# Patient Record
Sex: FEMALE | Race: WHITE | URBAN - METROPOLITAN AREA
[De-identification: names, ages, dates, MRNs, and addresses within clinical notes are randomized per-mention and may not be internally consistent; named-entity substitution may affect disease eponyms.]

---

## 2017-05-24 ENCOUNTER — HOSPITAL ENCOUNTER (OUTPATIENT)
Age: 62
Discharge: HOME | End: 2017-05-24

## 2017-05-31 ENCOUNTER — HOSPITAL ENCOUNTER (OUTPATIENT)
Age: 62
Discharge: HOME | End: 2017-05-31

## 2020-09-17 LAB — MAMMOGRAPHY, EXTERNAL: NEGATIVE

## 2020-10-04 ENCOUNTER — HOSPITAL ENCOUNTER (OUTPATIENT)
Age: 65
Setting detail: SPECIMEN
Discharge: HOME | End: 2020-10-04
Payer: COMMERCIAL

## 2020-10-04 ENCOUNTER — OFFICE VISIT (OUTPATIENT)
Dept: PRIMARY CARE CLINIC | Age: 65
End: 2020-10-04
Payer: COMMERCIAL

## 2020-10-04 VITALS
SYSTOLIC BLOOD PRESSURE: 138 MMHG | HEART RATE: 80 BPM | BODY MASS INDEX: 29.09 KG/M2 | TEMPERATURE: 97 F | HEIGHT: 66 IN | OXYGEN SATURATION: 99 % | WEIGHT: 181 LBS | DIASTOLIC BLOOD PRESSURE: 80 MMHG

## 2020-10-04 LAB — URIC ACID: 8.8 MG/DL (ref 2.4–5.7)

## 2020-10-04 PROCEDURE — 99203 OFFICE O/P NEW LOW 30 MIN: CPT | Performed by: FAMILY MEDICINE

## 2020-10-04 PROCEDURE — 84550 ASSAY OF BLOOD/URIC ACID: CPT

## 2020-10-04 PROCEDURE — 36415 COLL VENOUS BLD VENIPUNCTURE: CPT

## 2020-10-04 RX ORDER — PREDNISONE 20 MG/1
TABLET ORAL
Qty: 10 TABLET | Refills: 0 | Status: SHIPPED | OUTPATIENT
Start: 2020-10-04 | End: 2020-11-17

## 2020-10-04 RX ORDER — METRONIDAZOLE 500 MG/1
TABLET ORAL
COMMUNITY
Start: 2020-09-25 | End: 2020-11-17

## 2020-10-04 ASSESSMENT — ENCOUNTER SYMPTOMS
COLOR CHANGE: 1
EYES NEGATIVE: 1
RESPIRATORY NEGATIVE: 1
BACK PAIN: 0

## 2020-10-04 NOTE — LETTER
[address] [facility] A department of [facility] 99  Phone: [phone]  Fax: [phone] Doreen Estrella DO      October 4, 2020    Patient:   Nevin Monson  Date of Birth   1955  Date of visit   10/4/2020        To Whom it May Concern:      Nevin Monson was seen in my clinic on 10/4/2020. Please excuse from work 10/5/20-10/6/20 due to acute medical issues. If you have any questions or concerns, please don't hesitate to call.       Sincerely,      Kavya Rosales, DO

## 2020-10-04 NOTE — PATIENT INSTRUCTIONS
Patient Education        Purine-Restricted Diet: Care Instructions  Your Care Instructions     Purines are substances that are found in some foods. Your body turns purines into uric acid. High levels of uric acid can cause gout, which is a form of arthritis that causes pain and inflammation in joints. You may be able to help control the amount of uric acid in your body by limiting high-purine foods in your diet. Follow-up care is a key part of your treatment and safety. Be sure to make and go to all appointments, and call your doctor if you are having problems. It's also a good idea to know your test results and keep a list of the medicines you take. How can you care for yourself at home? · Plan your meals and snacks around foods that are low in purines and are safe for you to eat. These foods include:  ? Green vegetables and tomatoes. ? Fruits. ? Whole-grain breads, rice, and cereals. ? Eggs, peanut butter, and nuts. ? Low-fat milk, cheese, and other milk products. ? Popcorn. ? Gelatin desserts, chocolate, cocoa, and cakes and sweets, in small amounts. · You can eat certain foods that are medium-high in purines, but eat them only once in a while. These foods include:  ? Legumes, such as dried beans and dried peas. You can have 1 cup cooked legumes each day. ? Asparagus, cauliflower, spinach, mushrooms, and green peas. ? Fish and seafood (other than very high-purine seafood). ? Oatmeal, wheat bran, and wheat germ. · Limit very high-purine foods, including:  ? Organ meats, such as liver, kidneys, sweetbreads, and brains. ? Meats, including gee, beef, pork, and lamb. ? Game meats and any other meats in large amounts. ? Anchovies, sardines, herring, mackerel, and scallops. ? Gravy. ? Beer. Where can you learn more? Go to https://chpepiceweb."[employer]. org and sign in to your [employer] account.  Enter F448 in the [facility] box to learn more about \"Purine-Restricted Diet: Care Instructions. \"     If you do not have an account, please click on the \"Sign Up Now\" link. Current as of: August 22, 2019               Content Version: 12.5  © 2006-2020 [employer]. Care instructions adapted under license by [facility] [address] If you have questions about a medical condition or this instruction, always ask your healthcare professional. [address] any warranty or liability for your use of this information. Patient Education        Gout: Care Instructions  Your Care Instructions     Gout is a form of arthritis caused by a buildup of uric acid crystals in a joint. It causes sudden attacks of pain, swelling, redness, and stiffness, usually in one joint, especially the big toe. Gout usually comes on without a cause. But it can be brought on by drinking alcohol (especially beer) or eating seafood and red meat. Taking certain medicines, such as diuretics or aspirin, also can bring on an attack of gout. Taking your medicines as prescribed and following up with your doctor regularly can help you avoid gout attacks in the future. Follow-up care is a key part of your treatment and safety. Be sure to make and go to all appointments, and call your doctor if you are having problems. It's also a good idea to know your test results and keep a list of the medicines you take. How can you care for yourself at home? · If the joint is swollen, put ice or a cold pack on the area for 10 to 20 minutes at a time. Put a thin cloth between the ice and your skin. · Prop up the sore limb on a pillow when you ice it or anytime you sit or lie down during the next 3 days. Try to keep it above the level of your heart. This will help reduce swelling. · Rest sore joints. Avoid activities that put weight or strain on the joints for a few days. Take short rest breaks from your regular activities during the day. · Take your medicines exactly as prescribed.  Call your doctor if you think you are having a problem with your medicine. · Take pain medicines exactly as directed. ? If the doctor gave you a prescription medicine for pain, take it as prescribed. ? If you are not taking a prescription pain medicine, ask your doctor if you can take an over-the-counter medicine. · Eat less seafood and red meat. · Check with your doctor before drinking alcohol. · Losing weight, if you are overweight, may help reduce attacks of gout. But do not go on a [employer]. \" Losing a lot of weight in a short amount of time can cause a gout attack. When should you call for help? Call your doctor now or seek immediate medical care if:  · You have a fever. · The joint is so painful you cannot use it. · You have sudden, unexplained swelling, redness, warmth, or severe pain in one or more joints. Watch closely for changes in your health, and be sure to contact your doctor if:  · You have joint pain. · Your symptoms get worse or are not improving after 2 or 3 days. Where can you learn more? Go to https://[employer]. org and sign in to your [employer] account. Enter O660 in the [facility] box to learn more about \"Gout: Care Instructions. \"     If you do not have an account, please click on the \"Sign Up Now\" link. Current as of: December 9, 2019               Content Version: 12.5  © 7087-6460 [employer]. Care instructions adapted under license by [facility] ([facility]). If you have questions about a medical condition or this instruction, always ask your healthcare professional. [place] [address] any warranty or liability for your use of this information.

## 2020-10-04 NOTE — PROGRESS NOTES
10/4/2020     Luca Whittaker (:  1955) is a 71 y.o. female, here for evaluation of the following medical concerns:    Hand Pain    The incident occurred 3 to 5 days ago (started with pain on Thursday without any injury. Seemed better on Friday, but now is significantly flared again). There was no injury mechanism. The pain is present in the left wrist. The quality of the pain is described as aching. The pain is severe. The pain has been constant since the incident. Pertinent negatives include no numbness or tingling. The symptoms are aggravated by movement, lifting and palpation. She has tried acetaminophen (unable to take NSAIDs due to chronic kidney disease. Did use a topical pain patch which maybe helped slightly) for the symptoms. The treatment provided mild relief. Did review patient's med list, allergies, social history,pmhx and pshx today as noted in the record. Review of Systems   Constitutional: Negative for chills, fatigue and fever. HENT: Negative. Eyes: Negative. Respiratory: Negative. Musculoskeletal: Positive for arthralgias and joint swelling. Negative for back pain, gait problem, myalgias, neck pain and neck stiffness. Skin: Positive for color change (erythema to the left wrist joint). Negative for pallor, rash and wound. Neurological: Negative for tingling, weakness and numbness. Psychiatric/Behavioral: Negative. Prior to Visit Medications    Medication Sig Taking?  Authorizing Provider   metroNIDAZOLE (FLAGYL) 500 MG tablet take 1 tablet by mouth twice a day for 7 days AVOID ANY ALCOHOL DURING THIS WEEK Yes Historical Provider, MD   Biotin 1000 MCG TABS Take 1,000 mcg by mouth daily Yes Historical Provider, MD   predniSONE (DELTASONE) 20 MG tablet 2 tabs daily Yes Bess Tavera DO   valsartan-hydrochlorothiazide (DIOVAN HCT) 160-12.5 MG per tablet Take 1 tablet by mouth daily Yes Historical Provider, MD   cholestyramine Jadyn Sprague) 4 G packet Take 1 packet by mouth daily Yes Historical Provider, MD        Social History     Tobacco Use    Smoking status: Never Smoker    Smokeless tobacco: Never Used   Substance Use Topics    Alcohol use: No        Vitals:    10/04/20 1311   BP: 138/80   Site: Left Upper Arm   Position: Sitting   Cuff Size: Large Adult   Pulse: 80   Temp: 97 °F (36.1 °C)   TempSrc: Tympanic   SpO2: 99%   Weight: 181 lb (82.1 kg)   Height: 5' 6\" (1.676 m)     Estimated body mass index is 29.21 kg/m² as calculated from the following:    Height as of this encounter: 5' 6\" (1.676 m). Weight as of this encounter: 181 lb (82.1 kg). Physical Exam  Vitals signs and nursing note reviewed. Constitutional:       General: She is not in acute distress. Appearance: She is well-developed. She is not diaphoretic. HENT:      Head: Normocephalic and atraumatic. Eyes:      Conjunctiva/sclera: Conjunctivae normal.   Neck:      Musculoskeletal: Normal range of motion. Pulmonary:      Effort: Pulmonary effort is normal.   Musculoskeletal:         General: Swelling and tenderness present. No deformity or signs of injury. Comments: Tenderness with palpation to the left wrist.  Has erythema and swelling noted to the joint. Pain with any movement of the joint. Able to flex and extend the digits of the hand without difficulty. Neurovascularly intact   Skin:     General: Skin is warm and dry. Capillary Refill: Capillary refill takes less than 2 seconds. Coloration: Skin is not pale. Findings: Erythema present. No rash. Neurological:      Mental Status: She is alert and oriented to person, place, and time. Psychiatric:         Behavior: Behavior normal.         Thought Content: Thought content normal.         Judgment: Judgment normal.         ASSESSMENT/PLAN:  Encounter Diagnoses   Name Primary?     Acute gout due to renal impairment involving left wrist Yes    Left wrist pain      Orders Placed This Encounter Medications    predniSONE (DELTASONE) 20 MG tablet     Si tabs daily     Dispense:  10 tablet     Refill:  0     Orders Placed This Encounter   Procedures    Uric Acid     Standing Status:   Future     Number of Occurrences:   1     Standing Expiration Date:   10/4/2021     Lab Results   Component Value Date    URICACID 8.8 (H) 10/04/2020     Uric acid is elevated. Did discuss at length gout and treatment of gout. Patient hasn't been drinking as much water, so advised hydration and use of steroid as noted above. Avoid purine rich foods as able. List provided. If recurrent issues patient aware she may need to discuss allopurinol with PCP. Tylenol 1-2 tabs po q4h prn for pain    Return  if no improvement in symptoms or if any further symptoms arise. No follow-ups on file. An electronic signature was used to authenticate this note.     --Kavya Rosales, DO on 10/4/2020 at 2:04 PM

## 2020-11-17 ENCOUNTER — HOSPITAL ENCOUNTER (OUTPATIENT)
Dept: LAB | Age: 65
Discharge: HOME | End: 2020-11-17
Payer: COMMERCIAL

## 2020-11-17 ENCOUNTER — OFFICE VISIT (OUTPATIENT)
Dept: PODIATRY | Age: 65
End: 2020-11-17
Payer: COMMERCIAL

## 2020-11-17 VITALS
HEART RATE: 80 BPM | HEIGHT: 66 IN | DIASTOLIC BLOOD PRESSURE: 70 MMHG | BODY MASS INDEX: 29.09 KG/M2 | SYSTOLIC BLOOD PRESSURE: 118 MMHG | WEIGHT: 181 LBS

## 2020-11-17 LAB
ALBUMIN SERPL-MCNC: 4.1 G/DL (ref 3.5–5.2)
ALBUMIN/GLOBULIN RATIO: 1.3 (ref 1–2.5)
ALP BLD-CCNC: 83 U/L (ref 35–104)
ALT SERPL-CCNC: 10 U/L (ref 5–33)
AST SERPL-CCNC: 13 U/L
BILIRUB SERPL-MCNC: 0.56 MG/DL (ref 0.3–1.2)
BILIRUBIN DIRECT: 0.14 MG/DL
BILIRUBIN, INDIRECT: 0.42 MG/DL (ref 0–1)
GLOBULIN: 3.2 G/DL (ref 1.5–3.8)
TOTAL PROTEIN: 7.3 G/DL (ref 6.4–8.3)

## 2020-11-17 PROCEDURE — 99203 OFFICE O/P NEW LOW 30 MIN: CPT | Performed by: PODIATRIST

## 2020-11-17 PROCEDURE — 80076 HEPATIC FUNCTION PANEL: CPT

## 2020-11-17 PROCEDURE — 36415 COLL VENOUS BLD VENIPUNCTURE: CPT

## 2020-11-17 RX ORDER — VITAMIN B COMPLEX
TABLET ORAL
COMMUNITY

## 2020-11-17 RX ORDER — TERBINAFINE HYDROCHLORIDE 250 MG/1
250 TABLET ORAL DAILY
Qty: 90 TABLET | Refills: 0 | Status: SHIPPED | OUTPATIENT
Start: 2020-11-17 | End: 2021-02-15

## 2020-11-17 RX ORDER — UBIDECARENONE 75 MG
1000 CAPSULE ORAL DAILY
COMMUNITY

## 2020-11-17 NOTE — PROGRESS NOTES
atrophic skin. Neurological: Sensation intact to light touch to level of digits, bilateral.  Protective sensation intact 10/10 sites via 5.07/10g Adair-Grover Monofilament, bilateral.  negative Tinel's, bilateral.  negative Valleix sign, bilateral.  Vibratory intact bilateral.  Reflexes Decreased bilateral.  Paresthesias negative. Dysthesias negative. Sharp/dull intact bilateral.    Musculoskeletal: Muscle strength 5/5, bilateral.  Pain absent upon palpation bilateral. Normal medial longitudinal arch, bilateral.  Ankle ROM within normal limits,bilateral.  1st MPJ ROM within normal limits, bilateral.  Dorsally contracted digits absent. No other foot deformities. Integument: Warm, dry, supple, bilateral.  Open lesion absent, bilateral.  Interdigital maceration absent to web spaces bilateral.   Nails left 1 and right 1 thickened, dystrophic and crumbly, discolored with mild subungual debris. Seen distal 1/2 of nails with distal onycholysis. Fissures absent, bilateral. Hyperkeratotic tissue is absent. Asessment: Patient is a 76 y.o. female with:    Diagnosis Orders   1. Dermatophytosis of nail  Hepatic Function Panel   2. Deformity of nail bed  Hepatic Function Panel       Plan: Patient examined and evaluated. Current condition and treatment options discussed in detail. Orders Placed This Encounter   Procedures    Hepatic Function Panel     Standing Status:   Future     Standing Expiration Date:   12/17/2020     Pt given tx options for anti fungal therapy. Pt educated on Rx po lamisil, Rx topical Penlac, OTC home remedies or other OTC topical meds. If labs ok Send in Rx lamsil 250 mg #90, 1 po qd  Contact office with any questions/problems/concerns. RTC in 3month(s).

## 2020-12-11 ENCOUNTER — TELEPHONE (OUTPATIENT)
Dept: PODIATRY | Age: 65
End: 2020-12-11

## 2020-12-11 NOTE — TELEPHONE ENCOUNTER
Patient called stating that she looked up the Lamisil and seen that it should not be taken if the person has kidney disease. Patient stated that she does not want to take it if this is the case and is wondering if there is something else she can take that maybe is topical instead.      Patient's preferred pharmacy is [facility] in [place]    Last Appt:  11/17/2020  Next Appt:   Visit date not found  Med verified in Epic

## 2021-09-29 ENCOUNTER — OFFICE VISIT (OUTPATIENT)
Dept: PRIMARY CARE CLINIC | Age: 66
End: 2021-09-29
Payer: COMMERCIAL

## 2021-09-29 VITALS
SYSTOLIC BLOOD PRESSURE: 140 MMHG | OXYGEN SATURATION: 99 % | TEMPERATURE: 98.7 F | WEIGHT: 176 LBS | HEART RATE: 66 BPM | BODY MASS INDEX: 28.28 KG/M2 | DIASTOLIC BLOOD PRESSURE: 90 MMHG | HEIGHT: 66 IN

## 2021-09-29 DIAGNOSIS — M10.9 ACUTE GOUT OF RIGHT FOOT, UNSPECIFIED CAUSE: Primary | ICD-10-CM

## 2021-09-29 PROCEDURE — 99212 OFFICE O/P EST SF 10 MIN: CPT | Performed by: NURSE PRACTITIONER

## 2021-09-29 PROCEDURE — 99213 OFFICE O/P EST LOW 20 MIN: CPT | Performed by: NURSE PRACTITIONER

## 2021-09-29 RX ORDER — TRAMADOL HYDROCHLORIDE 50 MG/1
50 TABLET ORAL EVERY 6 HOURS PRN
Qty: 12 TABLET | Refills: 0 | Status: SHIPPED | OUTPATIENT
Start: 2021-09-29 | End: 2021-10-02

## 2021-09-29 RX ORDER — PREDNISONE 20 MG/1
20 TABLET ORAL 2 TIMES DAILY
Qty: 10 TABLET | Refills: 0 | Status: SHIPPED | OUTPATIENT
Start: 2021-09-29 | End: 2021-12-22 | Stop reason: SDUPTHER

## 2021-09-29 SDOH — ECONOMIC STABILITY: FOOD INSECURITY: WITHIN THE PAST 12 MONTHS, THE FOOD YOU BOUGHT JUST DIDN'T LAST AND YOU DIDN'T HAVE MONEY TO GET MORE.: NEVER TRUE

## 2021-09-29 SDOH — ECONOMIC STABILITY: FOOD INSECURITY: WITHIN THE PAST 12 MONTHS, YOU WORRIED THAT YOUR FOOD WOULD RUN OUT BEFORE YOU GOT MONEY TO BUY MORE.: NEVER TRUE

## 2021-09-29 ASSESSMENT — PATIENT HEALTH QUESTIONNAIRE - PHQ9
SUM OF ALL RESPONSES TO PHQ QUESTIONS 1-9: 0
2. FEELING DOWN, DEPRESSED OR HOPELESS: 0
SUM OF ALL RESPONSES TO PHQ QUESTIONS 1-9: 0
1. LITTLE INTEREST OR PLEASURE IN DOING THINGS: 0
SUM OF ALL RESPONSES TO PHQ QUESTIONS 1-9: 0
SUM OF ALL RESPONSES TO PHQ9 QUESTIONS 1 & 2: 0

## 2021-09-29 ASSESSMENT — SOCIAL DETERMINANTS OF HEALTH (SDOH): HOW HARD IS IT FOR YOU TO PAY FOR THE VERY BASICS LIKE FOOD, HOUSING, MEDICAL CARE, AND HEATING?: NOT HARD AT ALL

## 2021-09-29 NOTE — PROGRESS NOTES
Subjective:      Patient ID: Mary Staley is a 76 y.o. female coming in for   Chief Complaint   Patient presents with    Foot Swelling     right side started 2 days ago but worse this am when she woke up hx of gout        Swelling/pain to right foot ongoing for two days. Does have history of gout, feels like her previous flare ups. No injury/trauma. Has tried elevated and ice with minimal relief. Review of Systems     Objective:  BP (!) 140/90   Pulse 66   Temp 98.7 °F (37.1 °C)   Ht 5' 6\" (1.676 m)   Wt 176 lb (79.8 kg)   SpO2 99%   BMI 28.41 kg/m²      Physical Exam  Vitals and nursing note reviewed. Constitutional:       General: She is not in acute distress. Appearance: Normal appearance. Cardiovascular:      Pulses:           Dorsalis pedis pulses are 2+ on the right side. Posterior tibial pulses are 2+ on the right side. Pulmonary:      Effort: Pulmonary effort is normal.   Feet:      Right foot:      Skin integrity: Erythema (swelling noted to entire foot, but mainly over medial maleolous and dorsum of outer footf) present. No warmth. Neurological:      General: No focal deficit present. Mental Status: She is alert and oriented to person, place, and time. Assessment:      1. Acute gout of right foot, unspecified cause           Plan:    Meds as directed. Call PCP if symptoms worsen/persist     No orders of the defined types were placed in this encounter. Outpatient Encounter Medications as of 9/29/2021   Medication Sig Dispense Refill    predniSONE (DELTASONE) 20 MG tablet Take 1 tablet by mouth 2 times daily for 5 days 10 tablet 0    traMADol (ULTRAM) 50 MG tablet Take 1 tablet by mouth every 6 hours as needed for Pain for up to 3 days. Intended supply: 3 days.  Take lowest dose possible to manage pain 12 tablet 0    Vitamin D (CHOLECALCIFEROL) 25 MCG (1000 UT) TABS tablet Take by mouth       vitamin B-12 (CYANOCOBALAMIN) 100 MCG tablet Take 1,000 mcg by mouth daily      Biotin 5000 MCG CAPS Take 1,000 mcg by mouth daily       valsartan-hydroCHLOROthiazide (DIOVAN HCT) 320-12.5 MG per tablet Take 1 tablet by mouth daily       cholestyramine (QUESTRAN) 4 G packet Take 1 packet by mouth daily      [DISCONTINUED] ciclopirox (PENLAC) 8 % solution Apply topically nightly. (Patient not taking: Reported on 9/29/2021) 1 Bottle 3     No facility-administered encounter medications on file as of 9/29/2021.             Nicolas Wiley, JUVENTINO - CNP

## 2021-10-21 ENCOUNTER — OFFICE VISIT (OUTPATIENT)
Dept: FAMILY MEDICINE CLINIC | Age: 66
End: 2021-10-21
Payer: COMMERCIAL

## 2021-10-21 VITALS
SYSTOLIC BLOOD PRESSURE: 124 MMHG | HEART RATE: 57 BPM | TEMPERATURE: 97.4 F | OXYGEN SATURATION: 99 % | RESPIRATION RATE: 18 BRPM | WEIGHT: 181 LBS | HEIGHT: 66 IN | DIASTOLIC BLOOD PRESSURE: 74 MMHG | BODY MASS INDEX: 29.09 KG/M2

## 2021-10-21 DIAGNOSIS — K52.9 CHRONIC DIARRHEA: ICD-10-CM

## 2021-10-21 DIAGNOSIS — I12.9 BENIGN HYPERTENSION WITH CHRONIC KIDNEY DISEASE, STAGE III (HCC): Primary | ICD-10-CM

## 2021-10-21 DIAGNOSIS — N18.30 BENIGN HYPERTENSION WITH CHRONIC KIDNEY DISEASE, STAGE III (HCC): Primary | ICD-10-CM

## 2021-10-21 PROBLEM — M10.9 GOUTY ARTHRITIS: Status: ACTIVE | Noted: 2021-05-14

## 2021-10-21 PROCEDURE — 99203 OFFICE O/P NEW LOW 30 MIN: CPT | Performed by: NURSE PRACTITIONER

## 2021-10-21 RX ORDER — VALSARTAN AND HYDROCHLOROTHIAZIDE 160; 12.5 MG/1; MG/1
1 TABLET, FILM COATED ORAL DAILY
Qty: 90 TABLET | Refills: 1 | Status: SHIPPED | OUTPATIENT
Start: 2021-10-21 | End: 2022-03-25 | Stop reason: SDUPTHER

## 2021-10-21 RX ORDER — CHOLESTYRAMINE 4 G/9G
1 POWDER, FOR SUSPENSION ORAL DAILY
Qty: 90 PACKET | Refills: 3 | Status: SHIPPED | OUTPATIENT
Start: 2021-10-21 | End: 2022-03-25 | Stop reason: SDUPTHER

## 2021-10-21 RX ORDER — VALSARTAN AND HYDROCHLOROTHIAZIDE 160; 12.5 MG/1; MG/1
1 TABLET, FILM COATED ORAL DAILY
Qty: 14 TABLET | Refills: 0 | Status: SHIPPED | OUTPATIENT
Start: 2021-10-21 | End: 2021-10-21 | Stop reason: SDUPTHER

## 2021-10-21 ASSESSMENT — PATIENT HEALTH QUESTIONNAIRE - PHQ9
SUM OF ALL RESPONSES TO PHQ QUESTIONS 1-9: 0
2. FEELING DOWN, DEPRESSED OR HOPELESS: 0
1. LITTLE INTEREST OR PLEASURE IN DOING THINGS: 0
SUM OF ALL RESPONSES TO PHQ9 QUESTIONS 1 & 2: 0

## 2021-10-21 ASSESSMENT — ENCOUNTER SYMPTOMS: DIARRHEA: 1

## 2021-10-21 NOTE — PROGRESS NOTES
Subjective:      Patient ID: Cindy Wilcox is a 76 y.o. female coming in for   Chief Complaint   Patient presents with    Medication Adjustment     would like medications adjusted    New Patient     new to establish        HPI  Presents to office for new patient appt. Previous PCP was in [place], and she retired. Past medical history included chronic diarrhea with unknown etiology, HTN and CKD III. Pt has been taking valsartan 360mg once dialy for approx 2-3 weeks and states every since she switched from valsartan/HCTZ 160/12.5mg to the current valsartan dose she has had worsening diarrhea. Pt has been checking her BP at home and states it has been 117'N systolic. She would like to switch back. Pt switched to just valsartan to try and prevent gout flares with eliminating HCTZ. Pt had routine lab work done May 2021: showed a creatinine of 1.5, and GFR 36, which compared to her labs per careeverywhere, these are stable numbers for her. Pt also did a FIT test this year which was negative. Review of Systems   Gastrointestinal: Positive for diarrhea. All other systems reviewed and are negative. Objective:  /74   Pulse 57   Temp 97.4 °F (36.3 °C)   Resp 18   Ht 5' 6\" (1.676 m)   Wt 181 lb (82.1 kg)   SpO2 99%   BMI 29.21 kg/m²      Physical Exam  Vitals and nursing note reviewed. Constitutional:       General: She is not in acute distress. Appearance: Normal appearance. She is not ill-appearing. HENT:      Head: Normocephalic. Cardiovascular:      Rate and Rhythm: Normal rate and regular rhythm. Heart sounds: Normal heart sounds. Pulmonary:      Effort: Pulmonary effort is normal.      Breath sounds: Normal breath sounds. Musculoskeletal:      Right lower leg: No edema. Left lower leg: No edema. Skin:     General: Skin is warm and dry. Findings: No rash. Neurological:      General: No focal deficit present.       Mental Status: She is alert and oriented to person, place, and time. Assessment:      1. Benign hypertension with chronic kidney disease, stage III ([facility] [address]    2. Chronic diarrhea           Plan:    switched BP meds back to valsartan/HCTZ 160/12.5mg daily. rotuine labs ordered to be done in Nov 2021, pt to follow up in 6 months or sooner if needed. Orders Placed This Encounter   Procedures    CBC     Standing Status:   Future     Standing Expiration Date:   10/21/2022    Comprehensive Metabolic Panel     Standing Status:   Future     Standing Expiration Date:   10/21/2022   Dylan Chaudhary     Standing Status:   Future     Standing Expiration Date:   10/21/2022    Lipid Panel     Standing Status:   Future     Standing Expiration Date:   10/21/2022     Order Specific Question:   Is Patient Fasting?/# of Hours     Answer:   8hrs      Outpatient Encounter Medications as of 10/21/2021   Medication Sig Dispense Refill    cholestyramine (QUESTRAN) 4 g packet Take 1 packet by mouth daily 90 packet 3    valsartan-hydroCHLOROthiazide (DIOVAN-HCT) 160-12.5 MG per tablet Take 1 tablet by mouth daily 90 tablet 1    Vitamin D (CHOLECALCIFEROL) 25 MCG (1000 UT) TABS tablet Take by mouth       vitamin B-12 (CYANOCOBALAMIN) 100 MCG tablet Take 1,000 mcg by mouth daily      Biotin 5000 MCG CAPS Take 1,000 mcg by mouth daily       [DISCONTINUED] valsartan-hydroCHLOROthiazide (DIOVAN-HCT) 160-12.5 MG per tablet Take 1 tablet by mouth daily 14 tablet 0    [DISCONTINUED] valsartan-hydroCHLOROthiazide (DIOVAN HCT) 320-12.5 MG per tablet Take 1 tablet by mouth daily       [DISCONTINUED] cholestyramine (QUESTRAN) 4 G packet Take 1 packet by mouth daily       No facility-administered encounter medications on file as of 10/21/2021.             JUVENTINO Folyd - CNP

## 2021-12-21 DIAGNOSIS — M10.9 ACUTE GOUT OF RIGHT FOOT, UNSPECIFIED CAUSE: ICD-10-CM

## 2021-12-21 NOTE — TELEPHONE ENCOUNTER
Pt requesting for gout in her toe, states she's been working 12 hours days and is having a flare, please advise pt at above number tomorrow.

## 2021-12-22 RX ORDER — PREDNISONE 20 MG/1
20 TABLET ORAL 2 TIMES DAILY
Qty: 10 TABLET | Refills: 0 | Status: SHIPPED | OUTPATIENT
Start: 2021-12-22 | End: 2021-12-27

## 2022-02-08 ENCOUNTER — TELEPHONE (OUTPATIENT)
Dept: FAMILY MEDICINE CLINIC | Age: 67
End: 2022-02-08

## 2022-03-20 LAB
ALBUMIN SERPL-MCNC: 4.1 G/DL
ALP BLD-CCNC: 95 U/L
ALT SERPL-CCNC: 12 U/L
ANION GAP SERPL CALCULATED.3IONS-SCNC: 1.7 MMOL/L
AST SERPL-CCNC: 15 U/L
BASOPHILS ABSOLUTE: 0.1 /ΜL
BASOPHILS RELATIVE PERCENT: 1 %
BILIRUB SERPL-MCNC: 0.4 MG/DL (ref 0.1–1.4)
BUN BLDV-MCNC: 23 MG/DL
CALCIUM SERPL-MCNC: 9.9 MG/DL
CHLORIDE BLD-SCNC: 106 MMOL/L
CHOLESTEROL, TOTAL: 172 MG/DL
CHOLESTEROL/HDL RATIO: ABNORMAL
CO2: 26 MMOL/L
CREAT SERPL-MCNC: 1.42 MG/DL
EOSINOPHILS ABSOLUTE: 0.3 /ΜL
EOSINOPHILS RELATIVE PERCENT: 3 %
GFR CALCULATED: 41
GLUCOSE BLD-MCNC: 94 MG/DL
HCT VFR BLD CALC: 37 % (ref 36–46)
HDLC SERPL-MCNC: 75 MG/DL (ref 35–70)
HEMOGLOBIN: 12.8 G/DL (ref 12–16)
LDL CHOLESTEROL CALCULATED: 80 MG/DL (ref 0–160)
LYMPHOCYTES ABSOLUTE: 2.3 /ΜL
LYMPHOCYTES RELATIVE PERCENT: 23 %
MCH RBC QN AUTO: 31.4 PG
MCHC RBC AUTO-ENTMCNC: 34.6 G/DL
MCV RBC AUTO: 91 FL
MONOCYTES ABSOLUTE: 0.9 /ΜL
MONOCYTES RELATIVE PERCENT: 9 %
NEUTROPHILS ABSOLUTE: 6.3 /ΜL
NEUTROPHILS RELATIVE PERCENT: 64 %
NONHDLC SERPL-MCNC: ABNORMAL MG/DL
PDW BLD-RTO: NORMAL %
PLATELET # BLD: 326 K/ΜL
PMV BLD AUTO: NORMAL FL
POTASSIUM SERPL-SCNC: 4.1 MMOL/L
RBC # BLD: 4.07 10^6/ΜL
SODIUM BLD-SCNC: 144 MMOL/L
TOTAL PROTEIN: 6.5
TRIGL SERPL-MCNC: 97 MG/DL
VLDLC SERPL CALC-MCNC: 17 MG/DL
WBC # BLD: 9.8 10^3/ML

## 2022-03-25 ENCOUNTER — OFFICE VISIT (OUTPATIENT)
Dept: FAMILY MEDICINE CLINIC | Age: 67
End: 2022-03-25
Payer: COMMERCIAL

## 2022-03-25 VITALS
OXYGEN SATURATION: 99 % | HEART RATE: 60 BPM | HEIGHT: 66 IN | DIASTOLIC BLOOD PRESSURE: 74 MMHG | WEIGHT: 179 LBS | BODY MASS INDEX: 28.77 KG/M2 | SYSTOLIC BLOOD PRESSURE: 124 MMHG | RESPIRATION RATE: 16 BRPM

## 2022-03-25 DIAGNOSIS — K52.9 CHRONIC DIARRHEA: ICD-10-CM

## 2022-03-25 DIAGNOSIS — Z12.11 COLON CANCER SCREENING: ICD-10-CM

## 2022-03-25 DIAGNOSIS — N18.30 BENIGN HYPERTENSION WITH CHRONIC KIDNEY DISEASE, STAGE III (HCC): Primary | ICD-10-CM

## 2022-03-25 DIAGNOSIS — I12.9 BENIGN HYPERTENSION WITH CHRONIC KIDNEY DISEASE, STAGE III (HCC): Primary | ICD-10-CM

## 2022-03-25 DIAGNOSIS — M10.9 GOUTY ARTHRITIS: ICD-10-CM

## 2022-03-25 PROCEDURE — 99214 OFFICE O/P EST MOD 30 MIN: CPT | Performed by: NURSE PRACTITIONER

## 2022-03-25 PROCEDURE — 99213 OFFICE O/P EST LOW 20 MIN: CPT | Performed by: NURSE PRACTITIONER

## 2022-03-25 RX ORDER — CHOLESTYRAMINE 4 G/9G
1 POWDER, FOR SUSPENSION ORAL DAILY
Qty: 90 PACKET | Refills: 3 | Status: SHIPPED | OUTPATIENT
Start: 2022-03-25 | End: 2022-10-07 | Stop reason: SDUPTHER

## 2022-03-25 RX ORDER — PREDNISONE 20 MG/1
20 TABLET ORAL 2 TIMES DAILY
Qty: 10 TABLET | Refills: 0 | Status: SHIPPED | OUTPATIENT
Start: 2022-03-25 | End: 2022-03-30

## 2022-03-25 RX ORDER — VALSARTAN AND HYDROCHLOROTHIAZIDE 160; 12.5 MG/1; MG/1
1 TABLET, FILM COATED ORAL DAILY
Qty: 90 TABLET | Refills: 1 | Status: SHIPPED | OUTPATIENT
Start: 2022-03-25 | End: 2022-10-07 | Stop reason: SDUPTHER

## 2022-03-25 ASSESSMENT — PATIENT HEALTH QUESTIONNAIRE - PHQ9
SUM OF ALL RESPONSES TO PHQ QUESTIONS 1-9: 0
2. FEELING DOWN, DEPRESSED OR HOPELESS: 0
SUM OF ALL RESPONSES TO PHQ QUESTIONS 1-9: 0
SUM OF ALL RESPONSES TO PHQ QUESTIONS 1-9: 0
1. LITTLE INTEREST OR PLEASURE IN DOING THINGS: 0
SUM OF ALL RESPONSES TO PHQ9 QUESTIONS 1 & 2: 0
SUM OF ALL RESPONSES TO PHQ QUESTIONS 1-9: 0

## 2022-03-25 ASSESSMENT — ENCOUNTER SYMPTOMS
ORTHOPNEA: 0
BLURRED VISION: 0
SHORTNESS OF BREATH: 0

## 2022-03-25 NOTE — PROGRESS NOTES
Subjective:      Patient ID: Steven Montes De Oca is a 78 y.o. female coming in for   Chief Complaint   Patient presents with    6 Month Follow-Up     6 month follow up    Hypertension     medication working well        Hypertension  This is a chronic problem. The current episode started more than 1 year ago. The problem is unchanged. The problem is controlled. Pertinent negatives include no blurred vision, chest pain, headaches, orthopnea, palpitations, peripheral edema or shortness of breath. There are no associated agents to hypertension. There are no known risk factors for coronary artery disease. Treatments tried: valsartan/hctz 160/12.5mg   The current treatment provides significant improvement. Review of Systems   Eyes: Negative for blurred vision. Respiratory: Negative for shortness of breath. Cardiovascular: Negative for chest pain, palpitations and orthopnea. Musculoskeletal: Positive for arthralgias. Complaints of arthritic pain, has had one flare up of gout since last visit. Neurological: Negative for headaches. All other systems reviewed and are negative. Objective:  /74   Pulse 60   Resp 16   Ht 5' 6\" (1.676 m)   Wt 179 lb (81.2 kg)   SpO2 99%   BMI 28.89 kg/m²      Physical Exam  Vitals and nursing note reviewed. Constitutional:       General: She is not in acute distress. Appearance: Normal appearance. She is not ill-appearing. HENT:      Head: Normocephalic. Cardiovascular:      Rate and Rhythm: Normal rate and regular rhythm. Heart sounds: Normal heart sounds. Pulmonary:      Effort: Pulmonary effort is normal.      Breath sounds: Normal breath sounds. Musculoskeletal:      Right lower leg: No edema. Left lower leg: No edema. Skin:     General: Skin is warm and dry. Findings: No rash. Neurological:      General: No focal deficit present. Mental Status: She is alert and oriented to person, place, and time. Assessment:      1. Benign hypertension with chronic kidney disease, stage III ([facility] [address]    2. Gouty arthritis    3. Chronic diarrhea    4. Colon cancer screening           Plan:   -BP stable/controlled. Continue with valsartan/hctz  -prednisone script given for prn use  -I did discuss possible allopurinol if gout flares persist or worsen  -diet info given on low purine foods  -cologuard ordered  -will obtain labs done at outside facility  -f/u in 6 months     Orders Placed This Encounter   Procedures    Fecal DNA Colorectal cancer screening (Cologuard)     To be completed in June      Outpatient Encounter Medications as of 3/25/2022   Medication Sig Dispense Refill    cholestyramine (QUESTRAN) 4 g packet Take 1 packet by mouth daily 90 packet 3    valsartan-hydroCHLOROthiazide (DIOVAN-HCT) 160-12.5 MG per tablet Take 1 tablet by mouth daily 90 tablet 1    predniSONE (DELTASONE) 20 MG tablet Take 1 tablet by mouth 2 times daily for 5 days 10 tablet 0    Vitamin D (CHOLECALCIFEROL) 25 MCG (1000 UT) TABS tablet Take by mouth       vitamin B-12 (CYANOCOBALAMIN) 100 MCG tablet Take 1,000 mcg by mouth daily      Biotin 5000 MCG CAPS Take 1,000 mcg by mouth daily       [DISCONTINUED] cholestyramine (QUESTRAN) 4 g packet Take 1 packet by mouth daily 90 packet 3    [DISCONTINUED] valsartan-hydroCHLOROthiazide (DIOVAN-HCT) 160-12.5 MG per tablet Take 1 tablet by mouth daily 90 tablet 1     No facility-administered encounter medications on file as of 3/25/2022.             Sarah Glez, APRN - CNP

## 2022-03-25 NOTE — PATIENT INSTRUCTIONS
Patient Education        Purine-Restricted Diet: Care Instructions  Your Care Instructions     Purines are substances that are found in some foods. Your body turns purines into uric acid. High levels of uric acid can cause gout, which is a form of arthritis that causes pain and inflammation in joints. You may be able to help control the amount of uric acid in your body by limiting high-purine foods in your diet. Follow-up care is a key part of your treatment and safety. Be sure to make and go to all appointments, and call your doctor if you are having problems. It's also a good idea to know your test results and keep a list of the medicines you take. How can you care for yourself at home? · Plan your meals and snacks around foods that are low in purines and are safe for you to eat. These foods include:  ? Green vegetables and tomatoes. ? Fruits. ? Whole-grain breads, rice, and cereals. ? Eggs, peanut butter, and nuts. ? Low-fat milk, cheese, and other milk products. ? Popcorn. ? Gelatin desserts, chocolate, cocoa, and cakes and sweets, in small amounts. · You can eat certain foods that are medium-high in purines, but eat them only once in a while. These foods include:  ? Legumes, such as dried beans and dried peas. You can have 1 cup cooked legumes each day. ? Asparagus, cauliflower, spinach, mushrooms, and green peas. ? Fish and seafood (other than very high-purine seafood). ? Oatmeal, wheat bran, and wheat germ. · Limit very high-purine foods, including:  ? Organ meats, such as liver, kidneys, sweetbreads, and brains. ? Meats, including gee, beef, pork, and lamb. ? Game meats and any other meats in large amounts. ? Anchovies, sardines, herring, mackerel, and scallops. ? Gravy. ? Beer. Where can you learn more? Go to [email or web address] and sign in to your Instagram account.  Enter F448 in the [facility] box to learn more about \"Purine-Restricted Diet: Care Instructions. \"     If you do not have an account, please click on the \"Sign Up Now\" link. Current as of: September 8, 2021               Content Version: 13.1  © 2892-0186 [employer]wise, Incorporated. Care instructions adapted under license by [facility] ([facility]). If you have questions about a medical condition or this instruction, always ask your healthcare professional. [address] any warranty or liability for your use of this information.

## 2022-03-28 ENCOUNTER — TELEPHONE (OUTPATIENT)
Dept: FAMILY MEDICINE CLINIC | Age: 67
End: 2022-03-28

## 2022-03-28 NOTE — TELEPHONE ENCOUNTER
----- Message from Verner Days, APRN - CNP sent at 3/28/2022 12:43 PM EDT -----  Please let Latasha Rowe know I reviewed her labs. Renal function appears stable, cholesterol within a good range.  Remaining labs all normal.

## 2022-04-21 LAB — NONINV COLON CA DNA+OCC BLD SCRN STL QL: NEGATIVE

## 2022-04-28 ENCOUNTER — TELEPHONE (OUTPATIENT)
Dept: LAB | Age: 67
End: 2022-04-28

## 2022-04-28 NOTE — TELEPHONE ENCOUNTER
----- Message from JUVENTINO Turner CNP sent at 4/27/2022  5:25 PM EDT -----  Negative, repeat in 3 years

## 2022-04-28 NOTE — TELEPHONE ENCOUNTER
Writer called pt and LM with test results. Instructed pt to call the office back if she had any questions.

## 2022-08-05 ENCOUNTER — TELEPHONE (OUTPATIENT)
Dept: FAMILY MEDICINE CLINIC | Age: 67
End: 2022-08-05

## 2022-08-05 DIAGNOSIS — M10.9 GOUTY ARTHRITIS: Primary | ICD-10-CM

## 2022-08-05 RX ORDER — PREDNISONE 20 MG/1
20 TABLET ORAL 2 TIMES DAILY
Qty: 10 TABLET | Refills: 0 | Status: SHIPPED | OUTPATIENT
Start: 2022-08-05 | End: 2022-08-10

## 2022-08-05 NOTE — TELEPHONE ENCOUNTER
Patient is requesting a prescription for some prednisone for her foot. Patient states that Viviano Hamman told her to call any time that she needs a prescription to call.  Please leave a message on machine and advise if he approves this

## 2022-09-22 ENCOUNTER — TELEPHONE (OUTPATIENT)
Dept: FAMILY MEDICINE CLINIC | Age: 67
End: 2022-09-22

## 2022-09-22 NOTE — TELEPHONE ENCOUNTER
----- Message from Leighann Lin sent at 9/22/2022  3:42 PM EDT -----  Subject: Referral Request    Reason for referral request? pt requesting lab work to be ordered before   her appointment on october 7, please call pt when its ready   Provider patient wants to be referred to(if known):     Provider Phone Number(if known):     Additional Information for Provider?   ---------------------------------------------------------------------------  --------------  [id number]    [phone]; OK to leave message on voicemail  ---------------------------------------------------------------------------  --------------

## 2022-09-28 DIAGNOSIS — I12.9 BENIGN HYPERTENSION WITH CHRONIC KIDNEY DISEASE, STAGE III (HCC): Primary | ICD-10-CM

## 2022-09-28 DIAGNOSIS — I10 PRIMARY HYPERTENSION: ICD-10-CM

## 2022-09-28 DIAGNOSIS — E55.9 VITAMIN D DEFICIENCY: ICD-10-CM

## 2022-09-28 DIAGNOSIS — N18.30 BENIGN HYPERTENSION WITH CHRONIC KIDNEY DISEASE, STAGE III (HCC): Primary | ICD-10-CM

## 2022-09-28 NOTE — TELEPHONE ENCOUNTER
Patient has an appointment on 10/7/22, she is wanting to get labs done before her appointment. No labs are pended for next appointment. Please advise what labs you would like ordered for patient.

## 2022-10-02 LAB
ALBUMIN SERPL-MCNC: 4.3 G/DL
ALP BLD-CCNC: 95 U/L
ALT SERPL-CCNC: 11 U/L
ANION GAP SERPL CALCULATED.3IONS-SCNC: 1.8 MMOL/L
AST SERPL-CCNC: 16 U/L
BASOPHILS ABSOLUTE: 0.1 /ΜL
BASOPHILS RELATIVE PERCENT: 1 %
BILIRUB SERPL-MCNC: 0.5 MG/DL (ref 0.1–1.4)
BUN BLDV-MCNC: 19 MG/DL
CALCIUM SERPL-MCNC: 9.6 MG/DL
CHLORIDE BLD-SCNC: 106 MMOL/L
CHOLESTEROL, TOTAL: 200 MG/DL
CHOLESTEROL/HDL RATIO: ABNORMAL
CO2: 23 MMOL/L
CREAT SERPL-MCNC: 1.48 MG/DL
CREATININE URINE: 108 MG/DL
EOSINOPHILS ABSOLUTE: 0.2 /ΜL
EOSINOPHILS RELATIVE PERCENT: 3 %
GFR CALCULATED: 39
GLUCOSE BLD-MCNC: 94 MG/DL
HCT VFR BLD CALC: 37.4 % (ref 36–46)
HDLC SERPL-MCNC: 78 MG/DL (ref 35–70)
HEMOGLOBIN: 12.6 G/DL (ref 12–16)
LDL CHOLESTEROL CALCULATED: 106 MG/DL (ref 0–160)
LYMPHOCYTES ABSOLUTE: 1.6 /ΜL
LYMPHOCYTES RELATIVE PERCENT: 25 %
MCH RBC QN AUTO: 31.7 PG
MCHC RBC AUTO-ENTMCNC: 33.7 G/DL
MCV RBC AUTO: 94 FL
MICROALBUMIN/CREAT 24H UR: NORMAL MG/G{CREAT}
MONOCYTES ABSOLUTE: 0.6 /ΜL
MONOCYTES RELATIVE PERCENT: 10 %
NEUTROPHILS ABSOLUTE: 4.1 /ΜL
NEUTROPHILS RELATIVE PERCENT: 61 %
NONHDLC SERPL-MCNC: ABNORMAL MG/DL
PDW BLD-RTO: NORMAL %
PLATELET # BLD: 229 K/ΜL
PMV BLD AUTO: NORMAL FL
POTASSIUM SERPL-SCNC: 4.3 MMOL/L
RBC # BLD: 3.97 10^6/ΜL
SODIUM BLD-SCNC: 141 MMOL/L
TOTAL PROTEIN: 6.7
TRIGL SERPL-MCNC: 91 MG/DL
VITAMIN D 25-HYDROXY: 29.2
VITAMIN D2, 25 HYDROXY: NORMAL
VITAMIN D3,25 HYDROXY: NORMAL
VLDLC SERPL CALC-MCNC: 16 MG/DL
WBC # BLD: 6.6 10^3/ML

## 2022-10-05 DIAGNOSIS — N18.30 BENIGN HYPERTENSION WITH CHRONIC KIDNEY DISEASE, STAGE III (HCC): ICD-10-CM

## 2022-10-05 DIAGNOSIS — I10 PRIMARY HYPERTENSION: ICD-10-CM

## 2022-10-05 DIAGNOSIS — E55.9 VITAMIN D DEFICIENCY: ICD-10-CM

## 2022-10-05 DIAGNOSIS — I12.9 BENIGN HYPERTENSION WITH CHRONIC KIDNEY DISEASE, STAGE III (HCC): ICD-10-CM

## 2022-10-07 ENCOUNTER — OFFICE VISIT (OUTPATIENT)
Dept: FAMILY MEDICINE CLINIC | Age: 67
End: 2022-10-07
Payer: COMMERCIAL

## 2022-10-07 VITALS
HEART RATE: 54 BPM | OXYGEN SATURATION: 100 % | HEIGHT: 66 IN | BODY MASS INDEX: 29.25 KG/M2 | SYSTOLIC BLOOD PRESSURE: 136 MMHG | DIASTOLIC BLOOD PRESSURE: 78 MMHG | WEIGHT: 182 LBS | RESPIRATION RATE: 16 BRPM

## 2022-10-07 DIAGNOSIS — N18.30 BENIGN HYPERTENSION WITH CHRONIC KIDNEY DISEASE, STAGE III (HCC): Primary | ICD-10-CM

## 2022-10-07 DIAGNOSIS — I12.9 BENIGN HYPERTENSION WITH CHRONIC KIDNEY DISEASE, STAGE III (HCC): Primary | ICD-10-CM

## 2022-10-07 DIAGNOSIS — E55.9 VITAMIN D DEFICIENCY: ICD-10-CM

## 2022-10-07 DIAGNOSIS — Z78.0 POST-MENOPAUSAL: ICD-10-CM

## 2022-10-07 DIAGNOSIS — K52.9 CHRONIC DIARRHEA: ICD-10-CM

## 2022-10-07 DIAGNOSIS — Z13.820 OSTEOPOROSIS SCREENING: ICD-10-CM

## 2022-10-07 DIAGNOSIS — M10.9 GOUTY ARTHRITIS: ICD-10-CM

## 2022-10-07 PROCEDURE — 1123F ACP DISCUSS/DSCN MKR DOCD: CPT | Performed by: NURSE PRACTITIONER

## 2022-10-07 PROCEDURE — 99214 OFFICE O/P EST MOD 30 MIN: CPT | Performed by: NURSE PRACTITIONER

## 2022-10-07 RX ORDER — PREDNISONE 20 MG/1
20 TABLET ORAL 2 TIMES DAILY
Qty: 10 TABLET | Refills: 1 | Status: SHIPPED | OUTPATIENT
Start: 2022-10-07 | End: 2022-10-12

## 2022-10-07 RX ORDER — VALSARTAN AND HYDROCHLOROTHIAZIDE 160; 12.5 MG/1; MG/1
1 TABLET, FILM COATED ORAL DAILY
Qty: 90 TABLET | Refills: 1 | Status: SHIPPED | OUTPATIENT
Start: 2022-10-07 | End: 2022-10-07 | Stop reason: SDUPTHER

## 2022-10-07 RX ORDER — VALSARTAN AND HYDROCHLOROTHIAZIDE 160; 12.5 MG/1; MG/1
1 TABLET, FILM COATED ORAL DAILY
Qty: 90 TABLET | Refills: 1 | Status: SHIPPED | OUTPATIENT
Start: 2022-10-07

## 2022-10-07 RX ORDER — CHOLESTYRAMINE 4 G/9G
1 POWDER, FOR SUSPENSION ORAL DAILY
Qty: 90 PACKET | Refills: 3 | Status: SHIPPED | OUTPATIENT
Start: 2022-10-07 | End: 2022-10-07 | Stop reason: SDUPTHER

## 2022-10-07 RX ORDER — CHOLESTYRAMINE 4 G/9G
1 POWDER, FOR SUSPENSION ORAL DAILY
Qty: 90 PACKET | Refills: 3 | Status: SHIPPED | OUTPATIENT
Start: 2022-10-07

## 2022-10-07 ASSESSMENT — PATIENT HEALTH QUESTIONNAIRE - PHQ9
2. FEELING DOWN, DEPRESSED OR HOPELESS: 0
SUM OF ALL RESPONSES TO PHQ QUESTIONS 1-9: 0
SUM OF ALL RESPONSES TO PHQ9 QUESTIONS 1 & 2: 0
SUM OF ALL RESPONSES TO PHQ QUESTIONS 1-9: 0
SUM OF ALL RESPONSES TO PHQ QUESTIONS 1-9: 0
1. LITTLE INTEREST OR PLEASURE IN DOING THINGS: 0
SUM OF ALL RESPONSES TO PHQ QUESTIONS 1-9: 0

## 2022-10-07 ASSESSMENT — ENCOUNTER SYMPTOMS
SHORTNESS OF BREATH: 0
DIARRHEA: 1

## 2022-10-07 NOTE — PROGRESS NOTES
Subjective:      Patient ID: Rigoberto Valdovinos is a 78 y.o. female coming in for   Chief Complaint   Patient presents with    Hypertension     6 month follow up    Discuss Labs     Discuss labs from 10/2/22      Hypertension  This is a chronic problem. The current episode started more than 1 year ago. The problem is unchanged. The problem is controlled. Pertinent negatives include no chest pain, headaches, palpitations, peripheral edema or shortness of breath. There are no associated agents to hypertension. Risk factors for coronary artery disease include obesity and stress. Treatments tried: valsartan/HCTZ 160/12.5. The current treatment provides significant improvement. There are no compliance problems. Identifiable causes of hypertension include chronic renal disease. CKD 3 stable. GFR did mildly decrease from 41 to 39 from April to recent labs. Creatinine stable. Lytes stable. Pt on ARB. Vit D mildly low. Advised to increase vit D 25mcg daily to increase to 50mcg daily. Review of Systems   Respiratory:  Negative for shortness of breath. Cardiovascular:  Negative for chest pain and palpitations. Gastrointestinal:  Positive for diarrhea (Questran keeping diarrhea stable). Neurological:  Negative for headaches. Psychiatric/Behavioral:  The patient is nervous/anxious. All other systems reviewed and are negative. Objective:/78   Pulse 54   Resp 16   Ht 5' 6\" (1.676 m)   Wt 182 lb (82.6 kg)   SpO2 100%   BMI 29.38 kg/m²      Physical Exam  Vitals and nursing note reviewed. Constitutional:       General: She is not in acute distress. Appearance: Normal appearance. She is not ill-appearing. HENT:      Head: Normocephalic. Cardiovascular:      Rate and Rhythm: Normal rate and regular rhythm. Heart sounds: Normal heart sounds. Pulmonary:      Effort: Pulmonary effort is normal.      Breath sounds: Normal breath sounds.    Musculoskeletal:      Right lower leg: No edema. Left lower leg: No edema. Skin:     General: Skin is warm and dry. Findings: No rash. Neurological:      General: No focal deficit present. Mental Status: She is alert and oriented to person, place, and time. Psychiatric:         Mood and Affect: Mood is anxious. Assessment:      1. Benign hypertension with chronic kidney disease, stage III ([facility] [address]    2. Chronic diarrhea    3. Vitamin D deficiency    4. Osteoporosis screening    5. Post-menopausal    6. Gouty arthritis           Plan:    -BP controlled.    -diarrhea stable  - increase vit d to 50mcg daily  -dexa scan ordered  -will repeat BMP in 4 months to monitor CKD  -f/u in 6 months    Orders Placed This Encounter   Procedures    DEXA AXIAL SKELETON W VERTEBRAL FX ASST     Standing Status:   Future     Standing Expiration Date:   10/7/2023     Order Specific Question:   Reason for exam:     Answer:   post menopausal/ osteoporosis screening    Basic Metabolic Panel     Standing Status:   Future     Standing Expiration Date:   10/7/2023      Outpatient Encounter Medications as of 10/7/2022   Medication Sig Dispense Refill    predniSONE (DELTASONE) 20 MG tablet Take 1 tablet by mouth 2 times daily for 5 days 10 tablet 1    valsartan-hydroCHLOROthiazide (DIOVAN-HCT) 160-12.5 MG per tablet Take 1 tablet by mouth daily 90 tablet 1    cholestyramine (QUESTRAN) 4 g packet Take 1 packet by mouth daily 90 packet 3    Vitamin D (CHOLECALCIFEROL) 25 MCG (1000 UT) TABS tablet Take by mouth       vitamin B-12 (CYANOCOBALAMIN) 100 MCG tablet Take 1,000 mcg by mouth daily      Biotin 5000 MCG CAPS Take 1,000 mcg by mouth daily       [DISCONTINUED] cholestyramine (QUESTRAN) 4 g packet Take 1 packet by mouth daily 90 packet 3    [DISCONTINUED] valsartan-hydroCHLOROthiazide (DIOVAN-HCT) 160-12.5 MG per tablet Take 1 tablet by mouth daily 90 tablet 1    [DISCONTINUED] cholestyramine (QUESTRAN) 4 g packet Take 1 packet by mouth daily 90 packet 3    [DISCONTINUED] valsartan-hydroCHLOROthiazide (DIOVAN-HCT) 160-12.5 MG per tablet Take 1 tablet by mouth daily 90 tablet 1     No facility-administered encounter medications on file as of 10/7/2022.             Remi Scarce, APRN - CNP

## 2023-02-10 ENCOUNTER — TELEPHONE (OUTPATIENT)
Dept: FAMILY MEDICINE CLINIC | Age: 68
End: 2023-02-10

## 2023-02-10 DIAGNOSIS — M10.9 GOUTY ARTHRITIS: Primary | ICD-10-CM

## 2023-02-10 LAB
BUN BLDV-MCNC: 19 MG/DL
CALCIUM SERPL-MCNC: 9.6 MG/DL
CHLORIDE BLD-SCNC: 103 MMOL/L
CO2: 25 MMOL/L
CREAT SERPL-MCNC: 1.47 MG/DL
EGFR: 39
GLUCOSE BLD-MCNC: 96 MG/DL
POTASSIUM SERPL-SCNC: 4.5 MMOL/L
SODIUM BLD-SCNC: 143 MMOL/L

## 2023-02-10 RX ORDER — PREDNISONE 20 MG/1
20 TABLET ORAL 2 TIMES DAILY
Qty: 10 TABLET | Refills: 0 | Status: SHIPPED | OUTPATIENT
Start: 2023-02-10 | End: 2023-02-15

## 2023-02-10 NOTE — TELEPHONE ENCOUNTER
Pt calling stating she has gout and questions if you could call in some Prednisone as she's having a hard time getting in and out of the car, pt uses pended pharmacy, please advise.

## 2023-03-28 ENCOUNTER — OFFICE VISIT (OUTPATIENT)
Dept: PRIMARY CARE CLINIC | Age: 68
End: 2023-03-28
Payer: COMMERCIAL

## 2023-03-28 ENCOUNTER — TELEPHONE (OUTPATIENT)
Dept: FAMILY MEDICINE CLINIC | Age: 68
End: 2023-03-28

## 2023-03-28 VITALS
HEART RATE: 74 BPM | TEMPERATURE: 98.3 F | DIASTOLIC BLOOD PRESSURE: 74 MMHG | BODY MASS INDEX: 28.76 KG/M2 | OXYGEN SATURATION: 98 % | SYSTOLIC BLOOD PRESSURE: 126 MMHG | WEIGHT: 178.2 LBS

## 2023-03-28 DIAGNOSIS — M25.532 LEFT WRIST PAIN: Primary | ICD-10-CM

## 2023-03-28 DIAGNOSIS — Z87.39 HISTORY OF GOUT: ICD-10-CM

## 2023-03-28 DIAGNOSIS — M10.9 GOUTY ARTHRITIS: Primary | ICD-10-CM

## 2023-03-28 PROCEDURE — 99213 OFFICE O/P EST LOW 20 MIN: CPT | Performed by: FAMILY MEDICINE

## 2023-03-28 PROCEDURE — 1123F ACP DISCUSS/DSCN MKR DOCD: CPT | Performed by: FAMILY MEDICINE

## 2023-03-28 RX ORDER — PREDNISONE 20 MG/1
20 TABLET ORAL 2 TIMES DAILY
Qty: 10 TABLET | Refills: 0 | Status: SHIPPED | OUTPATIENT
Start: 2023-03-28 | End: 2023-04-02

## 2023-03-28 ASSESSMENT — PATIENT HEALTH QUESTIONNAIRE - PHQ9
SUM OF ALL RESPONSES TO PHQ QUESTIONS 1-9: 0
SUM OF ALL RESPONSES TO PHQ9 QUESTIONS 1 & 2: 0
SUM OF ALL RESPONSES TO PHQ QUESTIONS 1-9: 0
2. FEELING DOWN, DEPRESSED OR HOPELESS: 0
1. LITTLE INTEREST OR PLEASURE IN DOING THINGS: 0

## 2023-03-28 NOTE — LETTER
[address] [facility] A department of [place] [address]  Phone: [phone]  Fax: [phone]        Linda Jackson MD      March 29, 2023    Patient:   Lorri Wilkinson  Date of Birth   1955  Date of visit   3/28/2023        To Whom it May Concern:      Lorri Wilkinson was seen in my clinic on 3/28/2023. Please excuse from work 3/30 and 3/31/23. If you have any questions or concerns, please don't hesitate to call.       Sincerely,      Ariane Rosen MD/[facility]

## 2023-03-28 NOTE — LETTER
March 28, 2023       Severiano Hart YOB: 1955   [place] [id number] Date of Visit:  3/28/2023       To Whom It May Concern: It is my medical opinion that Severiano Hart should remain out of work until 03/27/2023-03/29/2023. If you have any questions or concerns, please don't hesitate to call.     Sincerely,        Ga Rosen MD/NF

## 2023-03-28 NOTE — TELEPHONE ENCOUNTER
Pt calling stating she has gout again and would like steroids sent to pended pharmacy, please advise.

## 2023-03-28 NOTE — PROGRESS NOTES
[facility]             [address] [place] [address], Formerly named [facility] [address]                      Telephone [phone]             Fax [phone]     Misty Miller  :  1955  Age:  75 y.o. MRN:  [id number]  Date of visit:  3/28/2023       Assessment and Plan:    1. Left wrist pain  2. History of gout  Gout flare vs. other  Prednisone was prescribed:  - predniSONE (DELTASONE) 20 MG tablet; Take 1 tablet by mouth 2 times daily for 5 days  Dispense: 10 tablet; Refill: 0      She was advised to follow up if symptoms worsen or do not resolve. Subjective:    Misty Miller is a 75 y.o. female who presents to [facility] today (3/28/2023) for evaluation of:  Gout (Left hand )      She reports pain in the left wrist for the past few days. She has a history of gout. She has taken Aleve without any significant improvement in her symptoms. She denies injury. She states that her current symptoms are similar to the symptoms she has had previously with gout flares. She has the following problem list:  Patient Active Problem List   Diagnosis    Benign hypertension with chronic kidney disease, stage III (HCC)    Chronic diarrhea    Gouty arthritis        Current medications are:  Outpatient Medications Marked as Taking for the 3/28/23 encounter (Office Visit) with SCHEDULE, PROVIDER [facility]   Medication Sig Dispense Refill    valsartan-hydroCHLOROthiazide (DIOVAN-HCT) 160-12.5 MG per tablet Take 1 tablet by mouth daily 90 tablet 1    cholestyramine (QUESTRAN) 4 g packet Take 1 packet by mouth daily 90 packet 3    Vitamin D (CHOLECALCIFEROL) 25 MCG (1000 UT) TABS tablet Take by mouth       vitamin B-12 (CYANOCOBALAMIN) 100 MCG tablet Take 1,000 mcg by mouth daily      Biotin 5000 MCG CAPS Take 1,000 mcg by mouth daily           She has No Known Allergies. She  reports that she has never smoked.  She has 27-Mar-2022 22:53

## 2023-03-29 RX ORDER — PREDNISONE 20 MG/1
20 TABLET ORAL 2 TIMES DAILY
Qty: 10 TABLET | Refills: 0 | Status: SHIPPED | OUTPATIENT
Start: 2023-03-29 | End: 2023-04-03

## 2023-03-29 NOTE — TELEPHONE ENCOUNTER
Linda Sherman called requesting a refill of the below medication which has been pended for you:     Requested Prescriptions      No prescriptions requested or ordered in this encounter       Last Appointment Date: 10/7/2022  Next Appointment Date: 4/28/2023    No Known Allergies

## 2023-05-04 ENCOUNTER — OFFICE VISIT (OUTPATIENT)
Dept: FAMILY MEDICINE CLINIC | Age: 68
End: 2023-05-04
Payer: COMMERCIAL

## 2023-05-04 VITALS
SYSTOLIC BLOOD PRESSURE: 124 MMHG | RESPIRATION RATE: 18 BRPM | DIASTOLIC BLOOD PRESSURE: 76 MMHG | BODY MASS INDEX: 29.41 KG/M2 | WEIGHT: 183 LBS | OXYGEN SATURATION: 95 % | HEART RATE: 75 BPM | HEIGHT: 66 IN

## 2023-05-04 DIAGNOSIS — I12.9 BENIGN HYPERTENSION WITH CHRONIC KIDNEY DISEASE, STAGE III (HCC): Primary | ICD-10-CM

## 2023-05-04 DIAGNOSIS — S69.92XA INJURY OF LEFT WRIST, INITIAL ENCOUNTER: ICD-10-CM

## 2023-05-04 DIAGNOSIS — N18.30 BENIGN HYPERTENSION WITH CHRONIC KIDNEY DISEASE, STAGE III (HCC): Primary | ICD-10-CM

## 2023-05-04 PROCEDURE — 1123F ACP DISCUSS/DSCN MKR DOCD: CPT | Performed by: NURSE PRACTITIONER

## 2023-05-04 PROCEDURE — 99214 OFFICE O/P EST MOD 30 MIN: CPT | Performed by: NURSE PRACTITIONER

## 2023-05-04 RX ORDER — VALSARTAN AND HYDROCHLOROTHIAZIDE 160; 12.5 MG/1; MG/1
1 TABLET, FILM COATED ORAL DAILY
Qty: 90 TABLET | Refills: 1 | Status: SHIPPED | OUTPATIENT
Start: 2023-05-04

## 2023-05-04 ASSESSMENT — PATIENT HEALTH QUESTIONNAIRE - PHQ9
SUM OF ALL RESPONSES TO PHQ9 QUESTIONS 1 & 2: 0
SUM OF ALL RESPONSES TO PHQ QUESTIONS 1-9: 0
SUM OF ALL RESPONSES TO PHQ QUESTIONS 1-9: 0
2. FEELING DOWN, DEPRESSED OR HOPELESS: 0
SUM OF ALL RESPONSES TO PHQ QUESTIONS 1-9: 0
1. LITTLE INTEREST OR PLEASURE IN DOING THINGS: 0
SUM OF ALL RESPONSES TO PHQ QUESTIONS 1-9: 0

## 2023-05-04 NOTE — PROGRESS NOTES
rash.   Neurological:      General: No focal deficit present. Mental Status: She is alert and oriented to person, place, and time. Psychiatric:         Mood and Affect: Mood is anxious. Assessment:      1. Benign hypertension with chronic kidney disease, stage III ([facility] [address]    2. Injury of left wrist, initial encounter           Plan:    -bp stable.  -will obtain bmp from outside source  -discussed xray of wrist. Will wait 1-2 weeks to see if symptoms worsen/persist  -f/u in 6 months or sooner if needed    No orders of the defined types were placed in this encounter. Outpatient Encounter Medications as of 5/4/2023   Medication Sig Dispense Refill    valsartan-hydroCHLOROthiazide (DIOVAN-HCT) 160-12.5 MG per tablet Take 1 tablet by mouth daily 90 tablet 1    cholestyramine (QUESTRAN) 4 g packet Take 1 packet by mouth daily 90 packet 3    Vitamin D (CHOLECALCIFEROL) 25 MCG (1000 UT) TABS tablet Take by mouth       vitamin B-12 (CYANOCOBALAMIN) 100 MCG tablet Take 10 tablets by mouth daily      Biotin 5000 MCG CAPS Take 1,000 mcg by mouth daily       [DISCONTINUED] valsartan-hydroCHLOROthiazide (DIOVAN-HCT) 160-12.5 MG per tablet Take 1 tablet by mouth daily 30 tablet 0     No facility-administered encounter medications on file as of 5/4/2023.             JUVENTINO Galvan - CNP

## 2023-05-05 ASSESSMENT — ENCOUNTER SYMPTOMS: SHORTNESS OF BREATH: 0

## 2023-05-07 ENCOUNTER — PATIENT MESSAGE (OUTPATIENT)
Dept: FAMILY MEDICINE CLINIC | Age: 68
End: 2023-05-07

## 2023-05-07 DIAGNOSIS — I12.9 BENIGN HYPERTENSION WITH CHRONIC KIDNEY DISEASE, STAGE III (HCC): ICD-10-CM

## 2023-05-07 DIAGNOSIS — N18.30 BENIGN HYPERTENSION WITH CHRONIC KIDNEY DISEASE, STAGE III (HCC): ICD-10-CM

## 2023-05-08 RX ORDER — VALSARTAN AND HYDROCHLOROTHIAZIDE 160; 12.5 MG/1; MG/1
1 TABLET, FILM COATED ORAL DAILY
Qty: 90 TABLET | Refills: 1 | Status: SHIPPED | OUTPATIENT
Start: 2023-05-08

## 2023-05-08 NOTE — TELEPHONE ENCOUNTER
From: Erman Koyanagi  To:  Mark Castrejon  Sent: 5/7/2023 9:55 PM EDT  Subject: Med refill    Was my valsartan 90day [facility] I forgot to mention it before I left my appt on may 4 Thanks Israel Mcclelland

## 2023-05-23 ENCOUNTER — OFFICE VISIT (OUTPATIENT)
Dept: PRIMARY CARE CLINIC | Age: 68
End: 2023-05-23
Payer: COMMERCIAL

## 2023-05-23 ENCOUNTER — HOSPITAL ENCOUNTER (OUTPATIENT)
Age: 68
Discharge: HOME | End: 2023-05-25
Payer: COMMERCIAL

## 2023-05-23 ENCOUNTER — HOSPITAL ENCOUNTER (OUTPATIENT)
Dept: GENERAL RADIOLOGY | Age: 68
Discharge: HOME | End: 2023-05-25
Payer: COMMERCIAL

## 2023-05-23 VITALS
OXYGEN SATURATION: 97 % | SYSTOLIC BLOOD PRESSURE: 129 MMHG | BODY MASS INDEX: 29.5 KG/M2 | HEART RATE: 71 BPM | WEIGHT: 182.8 LBS | DIASTOLIC BLOOD PRESSURE: 76 MMHG | TEMPERATURE: 97.9 F

## 2023-05-23 DIAGNOSIS — M25.532 LEFT WRIST PAIN: ICD-10-CM

## 2023-05-23 DIAGNOSIS — M25.532 LEFT WRIST PAIN: Primary | ICD-10-CM

## 2023-05-23 PROCEDURE — 73110 X-RAY EXAM OF WRIST: CPT

## 2023-05-23 PROCEDURE — 99214 OFFICE O/P EST MOD 30 MIN: CPT | Performed by: FAMILY MEDICINE

## 2023-05-23 PROCEDURE — 1123F ACP DISCUSS/DSCN MKR DOCD: CPT | Performed by: FAMILY MEDICINE

## 2023-05-23 SDOH — ECONOMIC STABILITY: FOOD INSECURITY: WITHIN THE PAST 12 MONTHS, THE FOOD YOU BOUGHT JUST DIDN'T LAST AND YOU DIDN'T HAVE MONEY TO GET MORE.: NEVER TRUE

## 2023-05-23 SDOH — ECONOMIC STABILITY: FOOD INSECURITY: WITHIN THE PAST 12 MONTHS, YOU WORRIED THAT YOUR FOOD WOULD RUN OUT BEFORE YOU GOT MONEY TO BUY MORE.: NEVER TRUE

## 2023-05-23 SDOH — ECONOMIC STABILITY: HOUSING INSECURITY
IN THE LAST 12 MONTHS, WAS THERE A TIME WHEN YOU DID NOT HAVE A STEADY PLACE TO SLEEP OR SLEPT IN A SHELTER (INCLUDING NOW)?: NO

## 2023-05-23 SDOH — ECONOMIC STABILITY: INCOME INSECURITY: HOW HARD IS IT FOR YOU TO PAY FOR THE VERY BASICS LIKE FOOD, HOUSING, MEDICAL CARE, AND HEATING?: NOT HARD AT ALL

## 2023-05-23 ASSESSMENT — ENCOUNTER SYMPTOMS
RESPIRATORY NEGATIVE: 1
GASTROINTESTINAL NEGATIVE: 1
EYES NEGATIVE: 1

## 2023-05-23 NOTE — PROGRESS NOTES
Subjective:      Patient ID: Lawyer Fugn is a 75 y.o. female. HPI  acute walk in clinic visit for left wrist pain. She was manipulating a package, lost control and while trying to catch it, she hit the left distal ulna on a sharp table edge. [place] 2 \"crack\"sounds. This was a month ago. Observation and supportive care. Continued chronic ache. Swelling still off and on. Not allowing any wt. Or carrying anything. Seen 5/4/23 for same. Rec. To cont. Supportive care and recheck if not getting better. Past Medical History:   Diagnosis Date    Hypertension      Past Surgical History:   Procedure Laterality Date    CHOLECYSTECTOMY      HYSTERECTOMY (CERVIX STATUS UNKNOWN)       Current Outpatient Medications   Medication Sig Dispense Refill    valsartan-hydroCHLOROthiazide (DIOVAN-HCT) 160-12.5 MG per tablet Take 1 tablet by mouth daily 90 tablet 1    cholestyramine (QUESTRAN) 4 g packet Take 1 packet by mouth daily 90 packet 3    Vitamin D (CHOLECALCIFEROL) 25 MCG (1000 UT) TABS tablet Take by mouth       vitamin B-12 (CYANOCOBALAMIN) 100 MCG tablet Take 10 tablets by mouth daily      Biotin 5000 MCG CAPS Take 1,000 mcg by mouth daily        No current facility-administered medications for this visit. No Known Allergies      Review of Systems   Constitutional: Negative. HENT: Negative. Eyes: Negative. Respiratory: Negative. Cardiovascular: Negative. Gastrointestinal: Negative. Genitourinary: Negative. Musculoskeletal:  Positive for arthralgias (left wrist). Skin: Negative. Neurological: Negative. Psychiatric/Behavioral: Negative. Objective:   Physical Exam  Constitutional:       General: She is not in acute distress. Appearance: She is well-developed. HENT:      Head: Normocephalic and atraumatic. Right Ear: External ear normal.      Left Ear: External ear normal.      Mouth/Throat:      Pharynx: No oropharyngeal exudate.    Eyes:

## 2023-08-04 DIAGNOSIS — K52.9 CHRONIC DIARRHEA: ICD-10-CM

## 2023-08-04 RX ORDER — CHOLESTYRAMINE 4 G/9G
1 POWDER, FOR SUSPENSION ORAL DAILY
Qty: 90 PACKET | Refills: 1 | Status: SHIPPED | OUTPATIENT
Start: 2023-08-04

## 2023-08-04 NOTE — TELEPHONE ENCOUNTER
Pt requesting temp refill to [facility].      Michelle Quiles called requesting a refill of the below medication which has been pended for you:     Requested Prescriptions     Pending Prescriptions Disp Refills    cholestyramine (QUESTRAN) 4 g packet 90 packet 1     Sig: Take 1 packet by mouth daily       Last Appointment Date: 5/4/2023  Next Appointment Date: Visit date not found    No Known Allergies

## 2023-09-01 DIAGNOSIS — K52.9 CHRONIC DIARRHEA: ICD-10-CM

## 2023-09-01 RX ORDER — CHOLESTYRAMINE 4 G/9G
1 POWDER, FOR SUSPENSION ORAL DAILY
Qty: 90 PACKET | Refills: 1 | Status: SHIPPED | OUTPATIENT
Start: 2023-09-01

## 2023-09-01 NOTE — TELEPHONE ENCOUNTER
Cely Wang called requesting a refill of the below medication which has been pended for you:     Patient requesting to be sent to [facility] because she prefers their      Requested Prescriptions     Pending Prescriptions Disp Refills    cholestyramine (QUESTRAN) 4 g packet 90 packet 1     Sig: Take 1 packet by mouth daily       Last Appointment Date: 5/4/2023  Next Appointment Date: 11/9/2023    No Known Allergies

## 2023-10-03 DIAGNOSIS — K52.9 CHRONIC DIARRHEA: ICD-10-CM

## 2023-10-03 NOTE — TELEPHONE ENCOUNTER
Pt requesting a written script so she can shop around as she's having a hard time finding pharmacy's that carry in, pt aware BE out until tomorrow.

## 2023-10-03 NOTE — TELEPHONE ENCOUNTER
Augustina Pino called requesting a refill of the below medication which has been pended for you:     Requested Prescriptions     Pending Prescriptions Disp Refills    cholestyramine (QUESTRAN) 4 g packet 90 packet 1     Sig: Take 1 packet by mouth daily       Last Appointment Date: 5/4/2023  Next Appointment Date: 12/1/2023    No Known Allergies

## 2023-10-04 DIAGNOSIS — K52.9 CHRONIC DIARRHEA: ICD-10-CM

## 2023-10-04 RX ORDER — CHOLESTYRAMINE 4 G/9G
1 POWDER, FOR SUSPENSION ORAL DAILY
Qty: 90 PACKET | Refills: 3 | Status: SHIPPED | OUTPATIENT
Start: 2023-10-04 | End: 2023-10-04 | Stop reason: SDUPTHER

## 2023-10-04 RX ORDER — CHOLESTYRAMINE 4 G/9G
1 POWDER, FOR SUSPENSION ORAL DAILY
Qty: 90 PACKET | Refills: 3 | Status: SHIPPED | OUTPATIENT
Start: 2023-10-04 | End: 2023-10-05 | Stop reason: SDUPTHER

## 2023-10-05 DIAGNOSIS — K52.9 CHRONIC DIARRHEA: ICD-10-CM

## 2023-10-05 RX ORDER — CHOLESTYRAMINE 4 G/9G
1 POWDER, FOR SUSPENSION ORAL DAILY
Qty: 90 PACKET | Refills: 3 | Status: SHIPPED | OUTPATIENT
Start: 2023-10-05

## 2023-11-13 DIAGNOSIS — I12.9 BENIGN HYPERTENSION WITH CHRONIC KIDNEY DISEASE, STAGE III (HCC): ICD-10-CM

## 2023-11-13 DIAGNOSIS — N18.30 BENIGN HYPERTENSION WITH CHRONIC KIDNEY DISEASE, STAGE III (HCC): ICD-10-CM

## 2023-11-14 ENCOUNTER — PATIENT MESSAGE (OUTPATIENT)
Dept: FAMILY MEDICINE CLINIC | Age: 68
End: 2023-11-14

## 2023-11-14 DIAGNOSIS — I12.9 BENIGN HYPERTENSION WITH CHRONIC KIDNEY DISEASE, STAGE III (HCC): Primary | ICD-10-CM

## 2023-11-14 DIAGNOSIS — N18.30 BENIGN HYPERTENSION WITH CHRONIC KIDNEY DISEASE, STAGE III (HCC): Primary | ICD-10-CM

## 2023-11-14 DIAGNOSIS — E55.9 VITAMIN D DEFICIENCY: ICD-10-CM

## 2023-11-14 RX ORDER — VALSARTAN AND HYDROCHLOROTHIAZIDE 160; 12.5 MG/1; MG/1
1 TABLET, FILM COATED ORAL DAILY
Qty: 90 TABLET | Refills: 0 | Status: SHIPPED | OUTPATIENT
Start: 2023-11-14

## 2023-11-14 NOTE — TELEPHONE ENCOUNTER
BE pt, Marielena Montelongo on notes    Ingris Lockwood called requesting a refill of the below medication which has been pended for you:     Requested Prescriptions     Pending Prescriptions Disp Refills    valsartan-hydroCHLOROthiazide (DIOVAN-HCT) 160-12.5 MG per tablet 90 tablet 0     Sig: Take 1 tablet by mouth daily       Last Appointment Date: 5/4/2023  Next Appointment Date: 12/1/2023    No Known Allergies

## 2023-11-15 NOTE — TELEPHONE ENCOUNTER
From: Jose Alberto Foy  To: Vane Manzanares  Sent: 11/14/2023 3:46 PM EST  Subject: Testing    Do I need any lab work to be done before my Dec 1 appt ? My ins will only cover the cost thru [facility].  If I need labs done for that appt can I pick it up tomorrow 11/15/23 thanks

## 2023-11-27 LAB
ALBUMIN: 4 G/DL
ALP BLD-CCNC: 98 U/L
ALT SERPL-CCNC: 12 U/L
ANION GAP SERPL CALCULATED.3IONS-SCNC: ABNORMAL MMOL/L
AST SERPL-CCNC: 14 U/L
BASOPHILS ABSOLUTE: 0.1 /ΜL
BASOPHILS RELATIVE PERCENT: 1 %
BILIRUB SERPL-MCNC: 0.4 MG/DL (ref 0.1–1.4)
BUN BLDV-MCNC: 20 MG/DL
CALCIUM SERPL-MCNC: 9.4 MG/DL
CHLORIDE BLD-SCNC: 101 MMOL/L
CHOLESTEROL, TOTAL: 185 MG/DL
CHOLESTEROL/HDL RATIO: ABNORMAL
CO2: 22 MMOL/L
CREAT SERPL-MCNC: 1.46 MG/DL
EOSINOPHILS ABSOLUTE: 0.2 /ΜL
EOSINOPHILS RELATIVE PERCENT: 3 %
GFR, ESTIMATED: ABNORMAL
GLUCOSE BLD-MCNC: 88 MG/DL
HCT VFR BLD CALC: 34.1 % (ref 36–46)
HDLC SERPL-MCNC: 69 MG/DL (ref 35–70)
HEMOGLOBIN: 11.4 G/DL (ref 12–16)
LDL CHOLESTEROL: 103
LYMPHOCYTES ABSOLUTE: 2.1 /ΜL
LYMPHOCYTES RELATIVE PERCENT: 29 %
MCH RBC QN AUTO: 30.9 PG
MCHC RBC AUTO-ENTMCNC: 33.4 G/DL
MCV RBC AUTO: 92 FL
MONOCYTES ABSOLUTE: 0.6 /ΜL
MONOCYTES RELATIVE PERCENT: 9 %
NEUTROPHILS ABSOLUTE: 4.1 /ΜL
NEUTROPHILS RELATIVE PERCENT: 58 %
NONHDLC SERPL-MCNC: ABNORMAL MG/DL
PLATELET # BLD: 217 K/ΜL
PMV BLD AUTO: ABNORMAL FL
POTASSIUM SERPL-SCNC: 3.8 MMOL/L
RBC # BLD: 3.69 10^6/ΜL
SODIUM BLD-SCNC: 136 MMOL/L
TOTAL PROTEIN: 6.3 G/DL (ref 6.4–8.2)
TRIGL SERPL-MCNC: 68 MG/DL
VITAMIN D 25-HYDROXY: 33.3
VITAMIN D2, 25 HYDROXY: NORMAL
VITAMIN D3,25 HYDROXY: NORMAL
VLDLC SERPL CALC-MCNC: 13 MG/DL
WBC # BLD: 7 10^3/ML

## 2023-12-01 ENCOUNTER — OFFICE VISIT (OUTPATIENT)
Dept: FAMILY MEDICINE CLINIC | Age: 68
End: 2023-12-01
Payer: COMMERCIAL

## 2023-12-01 VITALS
HEART RATE: 70 BPM | SYSTOLIC BLOOD PRESSURE: 124 MMHG | WEIGHT: 178 LBS | HEIGHT: 66 IN | OXYGEN SATURATION: 99 % | BODY MASS INDEX: 28.61 KG/M2 | DIASTOLIC BLOOD PRESSURE: 72 MMHG | RESPIRATION RATE: 16 BRPM

## 2023-12-01 DIAGNOSIS — K52.9 CHRONIC DIARRHEA: ICD-10-CM

## 2023-12-01 DIAGNOSIS — I12.9 BENIGN HYPERTENSION WITH CHRONIC KIDNEY DISEASE, STAGE III (HCC): Primary | ICD-10-CM

## 2023-12-01 DIAGNOSIS — N18.30 BENIGN HYPERTENSION WITH CHRONIC KIDNEY DISEASE, STAGE III (HCC): Primary | ICD-10-CM

## 2023-12-01 PROCEDURE — 99214 OFFICE O/P EST MOD 30 MIN: CPT | Performed by: NURSE PRACTITIONER

## 2023-12-01 PROCEDURE — 1123F ACP DISCUSS/DSCN MKR DOCD: CPT | Performed by: NURSE PRACTITIONER

## 2023-12-01 ASSESSMENT — PATIENT HEALTH QUESTIONNAIRE - PHQ9
1. LITTLE INTEREST OR PLEASURE IN DOING THINGS: 0
SUM OF ALL RESPONSES TO PHQ9 QUESTIONS 1 & 2: 0
SUM OF ALL RESPONSES TO PHQ QUESTIONS 1-9: 0
2. FEELING DOWN, DEPRESSED OR HOPELESS: 0
SUM OF ALL RESPONSES TO PHQ QUESTIONS 1-9: 0

## 2023-12-01 ASSESSMENT — ENCOUNTER SYMPTOMS
DIARRHEA: 1
CONSTIPATION: 0
BLOOD IN STOOL: 0
SHORTNESS OF BREATH: 0

## 2023-12-13 DIAGNOSIS — M10.9 GOUTY ARTHRITIS: Primary | ICD-10-CM

## 2023-12-13 RX ORDER — PREDNISONE 20 MG/1
20 TABLET ORAL 2 TIMES DAILY
Qty: 10 TABLET | Refills: 0 | Status: SHIPPED | OUTPATIENT
Start: 2023-12-13 | End: 2023-12-18

## 2024-02-13 DIAGNOSIS — N18.30 BENIGN HYPERTENSION WITH CHRONIC KIDNEY DISEASE, STAGE III (HCC): ICD-10-CM

## 2024-02-13 DIAGNOSIS — I12.9 BENIGN HYPERTENSION WITH CHRONIC KIDNEY DISEASE, STAGE III (HCC): ICD-10-CM

## 2024-02-14 RX ORDER — VALSARTAN AND HYDROCHLOROTHIAZIDE 160; 12.5 MG/1; MG/1
1 TABLET, FILM COATED ORAL DAILY
Qty: 90 TABLET | Refills: 0 | Status: SHIPPED | OUTPATIENT
Start: 2024-02-14

## 2024-02-14 NOTE — TELEPHONE ENCOUNTER
Lena called requesting a refill of the below medication which has been pended for you:     Requested Prescriptions     Pending Prescriptions Disp Refills    valsartan-hydroCHLOROthiazide (DIOVAN-HCT) 160-12.5 MG per tablet 90 tablet 0     Sig: Take 1 tablet by mouth daily       Last Appointment Date: 12/1/23  Next Appointment Date: Visit date not found    No Known Allergies

## 2024-04-08 RX ORDER — PREDNISONE 20 MG/1
20 TABLET ORAL 2 TIMES DAILY
Qty: 10 TABLET | Refills: 0 | Status: SHIPPED | OUTPATIENT
Start: 2024-04-08 | End: 2024-04-13

## 2024-04-30 DIAGNOSIS — I12.9 BENIGN HYPERTENSION WITH CHRONIC KIDNEY DISEASE, STAGE III (HCC): ICD-10-CM

## 2024-04-30 DIAGNOSIS — K52.9 CHRONIC DIARRHEA: ICD-10-CM

## 2024-04-30 DIAGNOSIS — N18.30 BENIGN HYPERTENSION WITH CHRONIC KIDNEY DISEASE, STAGE III (HCC): ICD-10-CM

## 2024-05-01 RX ORDER — VALSARTAN AND HYDROCHLOROTHIAZIDE 160; 12.5 MG/1; MG/1
1 TABLET, FILM COATED ORAL DAILY
Qty: 90 TABLET | Refills: 1 | Status: SHIPPED | OUTPATIENT
Start: 2024-05-01

## 2024-05-01 RX ORDER — CHOLESTYRAMINE 4 G/9G
1 POWDER, FOR SUSPENSION ORAL DAILY
Qty: 90 PACKET | Refills: 1 | Status: SHIPPED | OUTPATIENT
Start: 2024-05-01

## 2024-05-01 NOTE — TELEPHONE ENCOUNTER
Lena called requesting a refill of the below medication which has been pended for you:     Requested Prescriptions     Pending Prescriptions Disp Refills    cholestyramine (QUESTRAN) 4 g packet 90 packet 1     Sig: Take 1 packet by mouth daily       Last Appointment Date: 12/1/2023  Next Appointment Date: Visit date not found    No Known Allergies

## 2024-05-01 NOTE — TELEPHONE ENCOUNTER
Lena called requesting a refill of the below medication which has been pended for you:     Requested Prescriptions     Pending Prescriptions Disp Refills    valsartan-hydroCHLOROthiazide (DIOVAN-HCT) 160-12.5 MG per tablet 90 tablet 1     Sig: Take 1 tablet by mouth daily       Last Appointment Date: 12/1/2023  Next Appointment Date: 4/30/2024    No Known Allergies

## 2024-08-07 ENCOUNTER — COMMUNITY OUTREACH (OUTPATIENT)
Dept: FAMILY MEDICINE CLINIC | Age: 69
End: 2024-08-07

## 2024-08-07 NOTE — PROGRESS NOTES
Patient's HM shows they are overdue for Mammogram.  Care Everywhere and  files searched.   updated with 9/17/20 Mammogram.

## 2024-09-18 RX ORDER — PREDNISONE 20 MG/1
20 TABLET ORAL 2 TIMES DAILY
Qty: 10 TABLET | Refills: 0 | Status: SHIPPED | OUTPATIENT
Start: 2024-09-18 | End: 2024-09-23

## 2024-11-03 DIAGNOSIS — I12.9 BENIGN HYPERTENSION WITH CHRONIC KIDNEY DISEASE, STAGE III (HCC): ICD-10-CM

## 2024-11-03 DIAGNOSIS — K52.9 CHRONIC DIARRHEA: ICD-10-CM

## 2024-11-03 DIAGNOSIS — N18.30 BENIGN HYPERTENSION WITH CHRONIC KIDNEY DISEASE, STAGE III (HCC): ICD-10-CM

## 2024-11-04 RX ORDER — VALSARTAN AND HYDROCHLOROTHIAZIDE 160; 12.5 MG/1; MG/1
1 TABLET, FILM COATED ORAL DAILY
Qty: 90 TABLET | Refills: 1 | Status: SHIPPED | OUTPATIENT
Start: 2024-11-04

## 2024-11-04 RX ORDER — CHOLESTYRAMINE 4 G/9G
1 POWDER, FOR SUSPENSION ORAL DAILY
Qty: 90 PACKET | Refills: 1 | Status: SHIPPED | OUTPATIENT
Start: 2024-11-04

## 2024-11-04 NOTE — TELEPHONE ENCOUNTER
Lena called requesting a refill of the below medication which has been pended for you:     Requested Prescriptions     Pending Prescriptions Disp Refills    valsartan-hydroCHLOROthiazide (DIOVAN-HCT) 160-12.5 MG per tablet 90 tablet 1     Sig: Take 1 tablet by mouth daily       Last Appointment Date: 12/1/2023  Next Appointment Date: 11/3/2024    No Known Allergies

## 2024-11-04 NOTE — TELEPHONE ENCOUNTER
Lena called requesting a refill of the below medication which has been pended for you:     Requested Prescriptions     Pending Prescriptions Disp Refills    cholestyramine (QUESTRAN) 4 g packet 90 packet 1     Sig: Take 1 packet by mouth daily       Last Appointment Date: 12/1/2023  Next Appointment Date: 12/9/2024    No Known Allergies

## 2024-12-05 ENCOUNTER — PATIENT MESSAGE (OUTPATIENT)
Dept: FAMILY MEDICINE CLINIC | Age: 69
End: 2024-12-05

## 2024-12-05 DIAGNOSIS — Z12.31 ENCOUNTER FOR SCREENING MAMMOGRAM FOR BREAST CANCER: ICD-10-CM

## 2024-12-05 DIAGNOSIS — E55.9 VITAMIN D DEFICIENCY: ICD-10-CM

## 2024-12-05 DIAGNOSIS — Z13.220 LIPID SCREENING: ICD-10-CM

## 2024-12-05 DIAGNOSIS — Z78.0 POST-MENOPAUSAL: ICD-10-CM

## 2024-12-05 DIAGNOSIS — E53.8 B12 DEFICIENCY: ICD-10-CM

## 2024-12-05 DIAGNOSIS — I12.9 BENIGN HYPERTENSION WITH CHRONIC KIDNEY DISEASE, STAGE III (HCC): Primary | ICD-10-CM

## 2024-12-05 DIAGNOSIS — N18.30 BENIGN HYPERTENSION WITH CHRONIC KIDNEY DISEASE, STAGE III (HCC): Primary | ICD-10-CM

## 2024-12-05 DIAGNOSIS — M10.9 GOUTY ARTHRITIS: ICD-10-CM

## 2024-12-05 NOTE — TELEPHONE ENCOUNTER
Lena called requesting a refill of the below medication which has been pended for you:     Requested Prescriptions      No prescriptions requested or ordered in this encounter       Last Appointment Date: 12/1/2023  Next Appointment Date: 12/13/2024    No Known Allergies

## 2024-12-09 LAB
ALBUMIN: 4.1 G/DL
ALP BLD-CCNC: 111 U/L
ALT SERPL-CCNC: 11 U/L
ANION GAP SERPL CALCULATED.3IONS-SCNC: ABNORMAL MMOL/L
AST SERPL-CCNC: 17 U/L
BASOPHILS ABSOLUTE: 0.1 /ΜL
BASOPHILS RELATIVE PERCENT: 1 %
BILIRUB SERPL-MCNC: 0.4 MG/DL (ref 0.1–1.4)
BUN BLDV-MCNC: 17 MG/DL
CALCIUM SERPL-MCNC: 9.6 MG/DL
CHLORIDE BLD-SCNC: 107 MMOL/L
CHOLESTEROL, TOTAL: 185 MG/DL
CHOLESTEROL/HDL RATIO: ABNORMAL
CO2: 23 MMOL/L
CREAT SERPL-MCNC: 1.49 MG/DL
EOSINOPHILS ABSOLUTE: 0.2 /ΜL
EOSINOPHILS RELATIVE PERCENT: 2 %
FOLATE: 4.5
GFR, ESTIMATED: ABNORMAL
GLUCOSE BLD-MCNC: 90 MG/DL
HCT VFR BLD CALC: 37 % (ref 36–46)
HDLC SERPL-MCNC: 73 MG/DL (ref 35–70)
HEMOGLOBIN: 12.2 G/DL (ref 12–16)
LDL CHOLESTEROL: 93
LYMPHOCYTES ABSOLUTE: 1.9 /ΜL
LYMPHOCYTES RELATIVE PERCENT: 21 %
MCH RBC QN AUTO: 31.4 PG
MCHC RBC AUTO-ENTMCNC: 33 G/DL
MCV RBC AUTO: 95 FL
MICROALBUMIN/CREAT 24H UR: 10.3 MG/DL
MONOCYTES ABSOLUTE: 0.9 /ΜL
MONOCYTES RELATIVE PERCENT: 10 %
NEUTROPHILS ABSOLUTE: 6 /ΜL
NEUTROPHILS RELATIVE PERCENT: 66 %
NONHDLC SERPL-MCNC: ABNORMAL MG/DL
PDW BLD-RTO: 12.6 %
PLATELET # BLD: 309 K/ΜL
PMV BLD AUTO: NORMAL FL
POTASSIUM SERPL-SCNC: 4.1 MMOL/L
RBC # BLD: 3.88 10^6/ΜL
SODIUM BLD-SCNC: 144 MMOL/L
TOTAL PROTEIN: 6.7 G/DL (ref 6.4–8.2)
TRIGL SERPL-MCNC: 106 MG/DL
VITAMIN B-12: >2000
VITAMIN B-12: >2000
VITAMIN D 25-HYDROXY: 32.9
VITAMIN D2, 25 HYDROXY: NORMAL
VITAMIN D3,25 HYDROXY: NORMAL
VLDLC SERPL CALC-MCNC: 19 MG/DL
WBC # BLD: 9.1 10^3/ML

## 2024-12-11 DIAGNOSIS — M10.9 GOUTY ARTHRITIS: Primary | ICD-10-CM

## 2024-12-11 RX ORDER — PREDNISONE 20 MG/1
20 TABLET ORAL 2 TIMES DAILY
Qty: 10 TABLET | Refills: 0 | Status: SHIPPED | OUTPATIENT
Start: 2024-12-11 | End: 2024-12-13 | Stop reason: SDUPTHER

## 2024-12-13 ENCOUNTER — OFFICE VISIT (OUTPATIENT)
Dept: FAMILY MEDICINE CLINIC | Age: 69
End: 2024-12-13
Payer: COMMERCIAL

## 2024-12-13 VITALS
HEART RATE: 55 BPM | BODY MASS INDEX: 28.16 KG/M2 | WEIGHT: 175.2 LBS | RESPIRATION RATE: 18 BRPM | SYSTOLIC BLOOD PRESSURE: 128 MMHG | HEIGHT: 66 IN | DIASTOLIC BLOOD PRESSURE: 64 MMHG | OXYGEN SATURATION: 100 %

## 2024-12-13 DIAGNOSIS — Z13.220 LIPID SCREENING: ICD-10-CM

## 2024-12-13 DIAGNOSIS — I12.9 BENIGN HYPERTENSION WITH CHRONIC KIDNEY DISEASE, STAGE III (HCC): Primary | ICD-10-CM

## 2024-12-13 DIAGNOSIS — M10.9 GOUTY ARTHRITIS: ICD-10-CM

## 2024-12-13 DIAGNOSIS — N18.30 BENIGN HYPERTENSION WITH CHRONIC KIDNEY DISEASE, STAGE III (HCC): Primary | ICD-10-CM

## 2024-12-13 DIAGNOSIS — I12.9 BENIGN HYPERTENSION WITH CHRONIC KIDNEY DISEASE, STAGE III (HCC): ICD-10-CM

## 2024-12-13 DIAGNOSIS — N18.30 BENIGN HYPERTENSION WITH CHRONIC KIDNEY DISEASE, STAGE III (HCC): ICD-10-CM

## 2024-12-13 DIAGNOSIS — E53.8 B12 DEFICIENCY: ICD-10-CM

## 2024-12-13 DIAGNOSIS — K52.9 CHRONIC DIARRHEA: ICD-10-CM

## 2024-12-13 DIAGNOSIS — E55.9 VITAMIN D DEFICIENCY: ICD-10-CM

## 2024-12-13 PROCEDURE — 1123F ACP DISCUSS/DSCN MKR DOCD: CPT | Performed by: NURSE PRACTITIONER

## 2024-12-13 PROCEDURE — 99214 OFFICE O/P EST MOD 30 MIN: CPT | Performed by: NURSE PRACTITIONER

## 2024-12-13 RX ORDER — PREDNISONE 20 MG/1
20 TABLET ORAL 2 TIMES DAILY
Qty: 10 TABLET | Refills: 1 | Status: SHIPPED | OUTPATIENT
Start: 2024-12-13 | End: 2024-12-18

## 2024-12-13 RX ORDER — CHLORHEXIDINE GLUCONATE ORAL RINSE 1.2 MG/ML
SOLUTION DENTAL
COMMUNITY
Start: 2024-09-26 | End: 2024-12-13

## 2024-12-13 SDOH — ECONOMIC STABILITY: FOOD INSECURITY: WITHIN THE PAST 12 MONTHS, YOU WORRIED THAT YOUR FOOD WOULD RUN OUT BEFORE YOU GOT MONEY TO BUY MORE.: NEVER TRUE

## 2024-12-13 SDOH — ECONOMIC STABILITY: FOOD INSECURITY: WITHIN THE PAST 12 MONTHS, THE FOOD YOU BOUGHT JUST DIDN'T LAST AND YOU DIDN'T HAVE MONEY TO GET MORE.: NEVER TRUE

## 2024-12-13 SDOH — ECONOMIC STABILITY: INCOME INSECURITY: HOW HARD IS IT FOR YOU TO PAY FOR THE VERY BASICS LIKE FOOD, HOUSING, MEDICAL CARE, AND HEATING?: NOT VERY HARD

## 2024-12-13 ASSESSMENT — PATIENT HEALTH QUESTIONNAIRE - PHQ9
SUM OF ALL RESPONSES TO PHQ QUESTIONS 1-9: 0
1. LITTLE INTEREST OR PLEASURE IN DOING THINGS: NOT AT ALL
2. FEELING DOWN, DEPRESSED OR HOPELESS: NOT AT ALL
SUM OF ALL RESPONSES TO PHQ QUESTIONS 1-9: 0
SUM OF ALL RESPONSES TO PHQ9 QUESTIONS 1 & 2: 0
SUM OF ALL RESPONSES TO PHQ QUESTIONS 1-9: 0
SUM OF ALL RESPONSES TO PHQ QUESTIONS 1-9: 0

## 2024-12-13 ASSESSMENT — ENCOUNTER SYMPTOMS
DIARRHEA: 1
BLOOD IN STOOL: 0
SHORTNESS OF BREATH: 0
ABDOMINAL PAIN: 0
CONSTIPATION: 0

## 2024-12-13 NOTE — PROGRESS NOTES
Subjective:      Patient ID: Lena Sunshine is a 69 y.o. female coming in for   Chief Complaint   Patient presents with    Hypertension     annual    Discuss Labs     Review labs      HPI    HTN /CKD stable. Denies sob, cp, lightheadedness, leg swelling. Current regimen valsartan/hctz 160/12.5mg daily.   Recent labs show GFR stable at 38, with creatinine of 1.49.     Suffers from gouty arthritis. Tries to avoid NSAIDs due to CKD, due to aleve sparingly. Currently on prednisone burst due to bilateral foot pain.     Chronic diarrhea stable with use of cholestyramine.     Will be scheduling mammogram and dexa scan     Review of Systems   Constitutional:  Negative for fatigue and unexpected weight change.   Respiratory:  Negative for shortness of breath.    Cardiovascular:  Negative for chest pain and leg swelling.   Gastrointestinal:  Positive for diarrhea. Negative for abdominal pain, blood in stool and constipation.   Musculoskeletal:  Positive for arthralgias.   Skin:  Negative for rash.   Neurological:  Negative for dizziness, light-headedness and headaches.   All other systems reviewed and are negative.       Objective:/64   Pulse 55   Resp 18   Ht 1.676 m (5' 6\")   Wt 79.5 kg (175 lb 3.2 oz)   SpO2 100%   BMI 28.28 kg/m²      Physical Exam  Vitals and nursing note reviewed.   Constitutional:       General: She is not in acute distress.     Appearance: Normal appearance. She is not ill-appearing.      Comments: pleasant   HENT:      Head: Normocephalic.   Cardiovascular:      Rate and Rhythm: Normal rate and regular rhythm.      Heart sounds: Normal heart sounds.   Pulmonary:      Effort: Pulmonary effort is normal.      Breath sounds: Normal breath sounds.   Musculoskeletal:      Cervical back: Neck supple.      Right lower leg: No edema.      Left lower leg: No edema.   Skin:     General: Skin is warm and dry.      Findings: No rash.   Neurological:      General: No focal deficit present.

## 2025-02-27 RX ORDER — PREDNISONE 20 MG/1
20 TABLET ORAL 2 TIMES DAILY
Qty: 10 TABLET | Refills: 0 | Status: SHIPPED | OUTPATIENT
Start: 2025-02-27 | End: 2025-03-04

## 2025-04-22 ENCOUNTER — OFFICE VISIT (OUTPATIENT)
Dept: PRIMARY CARE CLINIC | Age: 70
End: 2025-04-22
Payer: COMMERCIAL

## 2025-04-22 VITALS
RESPIRATION RATE: 18 BRPM | HEART RATE: 58 BPM | DIASTOLIC BLOOD PRESSURE: 68 MMHG | HEIGHT: 66 IN | BODY MASS INDEX: 25.88 KG/M2 | WEIGHT: 161 LBS | TEMPERATURE: 98.1 F | SYSTOLIC BLOOD PRESSURE: 112 MMHG | OXYGEN SATURATION: 100 %

## 2025-04-22 DIAGNOSIS — M54.2 ACUTE NECK PAIN: Primary | ICD-10-CM

## 2025-04-22 PROCEDURE — 99213 OFFICE O/P EST LOW 20 MIN: CPT

## 2025-04-22 PROCEDURE — 1123F ACP DISCUSS/DSCN MKR DOCD: CPT

## 2025-04-22 RX ORDER — TIZANIDINE 2 MG/1
2 TABLET ORAL EVERY 8 HOURS PRN
Qty: 30 TABLET | Refills: 0 | Status: SHIPPED | OUTPATIENT
Start: 2025-04-22

## 2025-04-22 RX ORDER — PREDNISONE 20 MG/1
20 TABLET ORAL 2 TIMES DAILY
Qty: 10 TABLET | Refills: 0 | Status: SHIPPED | OUTPATIENT
Start: 2025-04-22 | End: 2025-04-27

## 2025-04-22 SDOH — ECONOMIC STABILITY: FOOD INSECURITY: WITHIN THE PAST 12 MONTHS, YOU WORRIED THAT YOUR FOOD WOULD RUN OUT BEFORE YOU GOT MONEY TO BUY MORE.: NEVER TRUE

## 2025-04-22 SDOH — ECONOMIC STABILITY: FOOD INSECURITY: WITHIN THE PAST 12 MONTHS, THE FOOD YOU BOUGHT JUST DIDN'T LAST AND YOU DIDN'T HAVE MONEY TO GET MORE.: NEVER TRUE

## 2025-04-22 ASSESSMENT — PATIENT HEALTH QUESTIONNAIRE - PHQ9
1. LITTLE INTEREST OR PLEASURE IN DOING THINGS: NOT AT ALL
SUM OF ALL RESPONSES TO PHQ QUESTIONS 1-9: 0
2. FEELING DOWN, DEPRESSED OR HOPELESS: NOT AT ALL
SUM OF ALL RESPONSES TO PHQ QUESTIONS 1-9: 0

## 2025-04-22 ASSESSMENT — ENCOUNTER SYMPTOMS
COUGH: 0
VOMITING: 0
CONSTIPATION: 0
TROUBLE SWALLOWING: 0
PHOTOPHOBIA: 0
SHORTNESS OF BREATH: 0
DIARRHEA: 0
NAUSEA: 0
ABDOMINAL PAIN: 0
VISUAL CHANGE: 0

## 2025-04-22 NOTE — PROGRESS NOTES
[facility]  [address]  [facility] [address]  Dept: [phone]  Dept Fax: [phone]    Lena Sunshine is a 69 y.o. female who presents today for her medical conditions/complaints as noted below. Lena Sunshine is c/o of   Chief Complaint   Patient presents with    Neck Pain     \"Pulled muscle \"in neck. Symptoms x 2 days. No trauma.   .    HPI:     Patient presents due to neck pain that started 2 days ago. She states that she was able to work yesterday, but she woke up with the pain worse this morning on both sides of her neck. She is able to move her neck but has pain with movement. She was sick last week with URI symptoms and is still feeling fatigued from that as well.    Neck Pain   This is a new problem. Episode onset: 2 days ago. The problem occurs constantly. The problem has been gradually worsening. The pain is associated with nothing. The pain is present in the right side, left side and midline. The pain is at a severity of 8/10. The pain is severe. The symptoms are aggravated by bending, twisting and position. Pertinent negatives include no chest pain, fever, headaches, leg pain, numbness, pain with swallowing, paresis, photophobia, tingling, trouble swallowing, visual change, weakness or weight loss. Treatments tried: Heat, ice, Tylenol. The treatment provided mild relief.     Past Medical History:   Diagnosis Date    Hypertension      Past Surgical History:   Procedure Laterality Date    CHOLECYSTECTOMY      HYSTERECTOMY (CERVIX STATUS UNKNOWN)         History reviewed. No pertinent family history.    Social History     Tobacco Use    Smoking status: Never    Smokeless tobacco: Never   Substance Use Topics    Alcohol use: No      Prior to Visit Medications    Medication Sig Taking? Authorizing Provider   valsartan-hydroCHLOROthiazide (DIOVAN-HCT)

## 2025-04-22 NOTE — PATIENT INSTRUCTIONS
Steroid in the morning with food- avoid taking with NSAIDs  Apply ice 20 mins at a time 4-6 times day. May alternate with heat  May use tylenol as needed for pain  Decrease activity today and tomorrow may increase as tolerated after two days of rest  Zanaflex as needed for muscle spasms- avoid drinking alcohol or driving after taking as it can make you drowsy  If symptoms worsen follow up with PCP

## 2025-04-24 ENCOUNTER — TELEPHONE (OUTPATIENT)
Dept: PRIMARY CARE CLINIC | Age: 70
End: 2025-04-24

## 2025-05-08 DIAGNOSIS — K52.9 CHRONIC DIARRHEA: ICD-10-CM

## 2025-05-08 DIAGNOSIS — N18.30 BENIGN HYPERTENSION WITH CHRONIC KIDNEY DISEASE, STAGE III (HCC): ICD-10-CM

## 2025-05-08 DIAGNOSIS — I12.9 BENIGN HYPERTENSION WITH CHRONIC KIDNEY DISEASE, STAGE III (HCC): ICD-10-CM

## 2025-05-09 NOTE — TELEPHONE ENCOUNTER
LMTRC to see if patient needs medication filled today to a local pharmacy or if she wants it sent to a mail order pharmacy

## 2025-05-09 NOTE — TELEPHONE ENCOUNTER
Lena called requesting a refill of the below medication which has been pended for you:     Requested Prescriptions     Pending Prescriptions Disp Refills    valsartan-hydroCHLOROthiazide (DIOVAN-HCT) 160-12.5 MG per tablet 30 tablet 0     Sig: Take 1 tablet by mouth daily       Last Appointment Date: 12/13/2024  Next Appointment Date: 5/8/2025    Allergies   Allergen Reactions    Amoxicillin Rash    Peridex Rinse [Chlorhexidine] Rash

## 2025-05-12 RX ORDER — VALSARTAN AND HYDROCHLOROTHIAZIDE 160; 12.5 MG/1; MG/1
1 TABLET, FILM COATED ORAL DAILY
Qty: 90 TABLET | Refills: 0 | Status: SHIPPED | OUTPATIENT
Start: 2025-05-12

## 2025-05-12 RX ORDER — CHOLESTYRAMINE 4 G/9G
1 POWDER, FOR SUSPENSION ORAL DAILY
Qty: 90 PACKET | Refills: 1 | Status: SHIPPED | OUTPATIENT
Start: 2025-05-12

## 2025-05-12 NOTE — TELEPHONE ENCOUNTER
Lena called requesting a refill of the below medication which has been pended for you:     Requested Prescriptions     Pending Prescriptions Disp Refills    cholestyramine (QUESTRAN) 4 g packet 90 packet 1     Sig: Take 1 packet by mouth daily       Last Appointment Date: 12/13/2024  Next Appointment Date: Visit date not found    Allergies   Allergen Reactions    Amoxicillin Rash    Peridex Rinse [Chlorhexidine] Rash

## 2025-07-15 ENCOUNTER — PATIENT MESSAGE (OUTPATIENT)
Dept: FAMILY MEDICINE CLINIC | Age: 70
End: 2025-07-15

## 2025-07-15 RX ORDER — PREDNISONE 20 MG/1
20 TABLET ORAL 2 TIMES DAILY
Qty: 10 TABLET | Refills: 0 | Status: SHIPPED | OUTPATIENT
Start: 2025-07-15 | End: 2025-07-20

## 2025-08-17 DIAGNOSIS — I12.9 BENIGN HYPERTENSION WITH CHRONIC KIDNEY DISEASE, STAGE III (HCC): ICD-10-CM

## 2025-08-17 DIAGNOSIS — N18.30 BENIGN HYPERTENSION WITH CHRONIC KIDNEY DISEASE, STAGE III (HCC): ICD-10-CM

## 2025-08-18 ENCOUNTER — PATIENT MESSAGE (OUTPATIENT)
Dept: FAMILY MEDICINE CLINIC | Age: 70
End: 2025-08-18

## 2025-08-19 DIAGNOSIS — N18.30 BENIGN HYPERTENSION WITH CHRONIC KIDNEY DISEASE, STAGE III (HCC): ICD-10-CM

## 2025-08-19 DIAGNOSIS — I12.9 BENIGN HYPERTENSION WITH CHRONIC KIDNEY DISEASE, STAGE III (HCC): ICD-10-CM

## 2025-08-19 RX ORDER — VALSARTAN AND HYDROCHLOROTHIAZIDE 160; 12.5 MG/1; MG/1
1 TABLET, FILM COATED ORAL DAILY
Qty: 90 TABLET | Refills: 0 | Status: SHIPPED | OUTPATIENT
Start: 2025-08-19

## 2025-08-19 RX ORDER — VALSARTAN AND HYDROCHLOROTHIAZIDE 160; 12.5 MG/1; MG/1
1 TABLET, FILM COATED ORAL DAILY
Qty: 90 TABLET | OUTPATIENT
Start: 2025-08-19

## 2025-08-19 RX ORDER — VALSARTAN AND HYDROCHLOROTHIAZIDE 160; 12.5 MG/1; MG/1
1 TABLET, FILM COATED ORAL DAILY
Qty: 30 TABLET | Refills: 0 | Status: SHIPPED | OUTPATIENT
Start: 2025-08-19